# Patient Record
Sex: MALE | Race: WHITE | NOT HISPANIC OR LATINO | Employment: UNEMPLOYED | URBAN - METROPOLITAN AREA
[De-identification: names, ages, dates, MRNs, and addresses within clinical notes are randomized per-mention and may not be internally consistent; named-entity substitution may affect disease eponyms.]

---

## 2023-10-21 ENCOUNTER — HOSPITAL ENCOUNTER (INPATIENT)
Facility: HOSPITAL | Age: 88
LOS: 2 days | Discharge: NURSING, REHAB OR LONG-TERM CARE | DRG: 184 | End: 2023-10-24
Attending: STUDENT IN AN ORGANIZED HEALTH CARE EDUCATION/TRAINING PROGRAM | Admitting: INTERNAL MEDICINE
Payer: MEDICARE

## 2023-10-21 ENCOUNTER — APPOINTMENT (OUTPATIENT)
Dept: RADIOLOGY | Facility: HOSPITAL | Age: 88
DRG: 184 | End: 2023-10-21
Payer: MEDICARE

## 2023-10-21 DIAGNOSIS — N30.00 ACUTE CYSTITIS WITHOUT HEMATURIA: ICD-10-CM
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
## 2023-10-21 DIAGNOSIS — N39.0 UTI (URINARY TRACT INFECTION), UNCOMPLICATED: ICD-10-CM

## 2023-10-21 DIAGNOSIS — S09.90XA INJURY OF HEAD, INITIAL ENCOUNTER: ICD-10-CM

## 2023-10-21 LAB
ANION GAP SERPL CALC-SCNC: 13 MMOL/L
APPEARANCE UR: ABNORMAL
BASOPHILS # BLD AUTO: 0.04 X10*3/UL (ref 0–0.1)
BASOPHILS NFR BLD AUTO: 0.2 %
BILIRUB UR STRIP.AUTO-MCNC: NEGATIVE MG/DL
BUN SERPL-MCNC: 26 MG/DL (ref 8–25)
CALCIUM SERPL-MCNC: 9.7 MG/DL (ref 8.5–10.4)
CHLORIDE SERPL-SCNC: 102 MMOL/L (ref 97–107)
CO2 SERPL-SCNC: 22 MMOL/L (ref 24–31)
COLOR UR: ABNORMAL
CREAT SERPL-MCNC: 1.1 MG/DL (ref 0.4–1.6)
EOSINOPHIL # BLD AUTO: 0.13 X10*3/UL (ref 0–0.4)
EOSINOPHIL NFR BLD AUTO: 0.7 %
ERYTHROCYTE [DISTWIDTH] IN BLOOD BY AUTOMATED COUNT: 13.1 % (ref 11.5–14.5)
GFR SERPL CREATININE-BSD FRML MDRD: 61 ML/MIN/1.73M*2
GLUCOSE SERPL-MCNC: 121 MG/DL (ref 65–99)
GLUCOSE UR STRIP.AUTO-MCNC: NORMAL MG/DL
HCT VFR BLD AUTO: 40.7 % (ref 41–52)
HGB BLD-MCNC: 13.8 G/DL (ref 13.5–17.5)
IMM GRANULOCYTES # BLD AUTO: 0.11 X10*3/UL (ref 0–0.5)
IMM GRANULOCYTES NFR BLD AUTO: 0.6 % (ref 0–0.9)
KETONES UR STRIP.AUTO-MCNC: ABNORMAL MG/DL
LACTATE BLDV-SCNC: 1 MMOL/L (ref 0.4–2)
LEUKOCYTE ESTERASE UR QL STRIP.AUTO: ABNORMAL
LYMPHOCYTES # BLD AUTO: 1.26 X10*3/UL (ref 0.8–3)
LYMPHOCYTES NFR BLD AUTO: 7.1 %
MCH RBC QN AUTO: 30.1 PG (ref 26–34)
MCHC RBC AUTO-ENTMCNC: 33.9 G/DL (ref 32–36)
MCV RBC AUTO: 89 FL (ref 80–100)
MONOCYTES # BLD AUTO: 0.89 X10*3/UL (ref 0.05–0.8)
MONOCYTES NFR BLD AUTO: 5 %
NEUTROPHILS # BLD AUTO: 15.41 X10*3/UL (ref 1.6–5.5)
NEUTROPHILS NFR BLD AUTO: 86.4 %
NITRITE UR QL STRIP.AUTO: NEGATIVE
NRBC BLD-RTO: 0 /100 WBCS (ref 0–0)
PH UR STRIP.AUTO: 5.5 [PH]
PLATELET # BLD AUTO: 302 X10*3/UL (ref 150–450)
PMV BLD AUTO: 9.3 FL (ref 7.5–11.5)
POTASSIUM SERPL-SCNC: 4.4 MMOL/L (ref 3.4–5.1)
PROT UR STRIP.AUTO-MCNC: ABNORMAL MG/DL
RBC # BLD AUTO: 4.58 X10*6/UL (ref 4.5–5.9)
RBC # UR STRIP.AUTO: ABNORMAL /UL
RBC #/AREA URNS AUTO: >20 /HPF
SODIUM SERPL-SCNC: 137 MMOL/L (ref 133–145)
SP GR UR STRIP.AUTO: 1.02
UROBILINOGEN UR STRIP.AUTO-MCNC: NORMAL MG/DL
WBC # BLD AUTO: 17.8 X10*3/UL (ref 4.4–11.3)
WBC #/AREA URNS AUTO: >50 /HPF
WBC CLUMPS #/AREA URNS AUTO: ABNORMAL /HPF
YEAST BUDDING #/AREA UR COMP ASSIST: PRESENT /HPF

## 2023-10-21 PROCEDURE — 81001 URINALYSIS AUTO W/SCOPE: CPT | Performed by: STUDENT IN AN ORGANIZED HEALTH CARE EDUCATION/TRAINING PROGRAM

## 2023-10-21 PROCEDURE — 72131 CT LUMBAR SPINE W/O DYE: CPT | Mod: MG

## 2023-10-21 PROCEDURE — 99285 EMERGENCY DEPT VISIT HI MDM: CPT | Mod: 25 | Performed by: STUDENT IN AN ORGANIZED HEALTH CARE EDUCATION/TRAINING PROGRAM

## 2023-10-21 PROCEDURE — 93010 ELECTROCARDIOGRAM REPORT: CPT | Performed by: INTERNAL MEDICINE

## 2023-10-21 PROCEDURE — 93005 ELECTROCARDIOGRAM TRACING: CPT

## 2023-10-21 PROCEDURE — 36415 COLL VENOUS BLD VENIPUNCTURE: CPT | Performed by: STUDENT IN AN ORGANIZED HEALTH CARE EDUCATION/TRAINING PROGRAM

## 2023-10-21 PROCEDURE — 96365 THER/PROPH/DIAG IV INF INIT: CPT

## 2023-10-21 PROCEDURE — 80048 BASIC METABOLIC PNL TOTAL CA: CPT | Performed by: STUDENT IN AN ORGANIZED HEALTH CARE EDUCATION/TRAINING PROGRAM

## 2023-10-21 PROCEDURE — 2500000001 HC RX 250 WO HCPCS SELF ADMINISTERED DRUGS (ALT 637 FOR MEDICARE OP): Performed by: PHYSICIAN ASSISTANT

## 2023-10-21 PROCEDURE — 36415 COLL VENOUS BLD VENIPUNCTURE: CPT | Performed by: PHYSICIAN ASSISTANT

## 2023-10-21 PROCEDURE — 83605 ASSAY OF LACTIC ACID: CPT | Performed by: PHYSICIAN ASSISTANT

## 2023-10-21 PROCEDURE — 2500000004 HC RX 250 GENERAL PHARMACY W/ HCPCS (ALT 636 FOR OP/ED): Performed by: PHYSICIAN ASSISTANT

## 2023-10-21 PROCEDURE — 87040 BLOOD CULTURE FOR BACTERIA: CPT | Mod: CMCLAB,WESLAB | Performed by: PHYSICIAN ASSISTANT

## 2023-10-21 PROCEDURE — 72128 CT CHEST SPINE W/O DYE: CPT | Mod: MG

## 2023-10-21 PROCEDURE — 85025 COMPLETE CBC W/AUTO DIFF WBC: CPT | Performed by: STUDENT IN AN ORGANIZED HEALTH CARE EDUCATION/TRAINING PROGRAM

## 2023-10-21 PROCEDURE — 71046 X-RAY EXAM CHEST 2 VIEWS: CPT

## 2023-10-21 RX ORDER — FUROSEMIDE 40 MG/1
40 TABLET ORAL
COMMUNITY
Start: 2020-03-24 | End: 2023-10-21 | Stop reason: ALTCHOICE

## 2023-10-21 RX ORDER — TAMSULOSIN HYDROCHLORIDE 0.4 MG/1
0.4 CAPSULE ORAL DAILY
COMMUNITY

## 2023-10-21 RX ORDER — IBUPROFEN 400 MG/1
400 TABLET ORAL ONCE
Status: COMPLETED | OUTPATIENT
Start: 2023-10-21 | End: 2023-10-21

## 2023-10-21 RX ORDER — VIT C/E/ZN/COPPR/LUTEIN/ZEAXAN 250MG-90MG
25 CAPSULE ORAL DAILY
COMMUNITY

## 2023-10-21 RX ORDER — ACETAMINOPHEN 325 MG/1
975 TABLET ORAL ONCE
Status: COMPLETED | OUTPATIENT
Start: 2023-10-21 | End: 2023-10-21

## 2023-10-21 RX ORDER — ASPIRIN 81 MG
100 TABLET, DELAYED RELEASE (ENTERIC COATED) ORAL DAILY
COMMUNITY

## 2023-10-21 RX ORDER — MULTIVIT-MIN/FA/LYCOPEN/LUTEIN .4-300-25
1 TABLET ORAL DAILY
COMMUNITY

## 2023-10-21 RX ORDER — CEFTRIAXONE 1 G/50ML
1 INJECTION, SOLUTION INTRAVENOUS ONCE
Status: COMPLETED | OUTPATIENT
Start: 2023-10-21 | End: 2023-10-21

## 2023-10-21 RX ADMIN — IBUPROFEN 400 MG: 400 TABLET, FILM COATED ORAL at 20:05

## 2023-10-21 RX ADMIN — ACETAMINOPHEN 975 MG: 325 TABLET ORAL at 20:05

## 2023-10-21 RX ADMIN — CEFTRIAXONE SODIUM 1 G: 1 INJECTION, SOLUTION INTRAVENOUS at 23:10

## 2023-10-21 ASSESSMENT — PAIN DESCRIPTION - DESCRIPTORS: DESCRIPTORS: ACHING

## 2023-10-21 ASSESSMENT — COLUMBIA-SUICIDE SEVERITY RATING SCALE - C-SSRS
2. HAVE YOU ACTUALLY HAD ANY THOUGHTS OF KILLING YOURSELF?: NO
6. HAVE YOU EVER DONE ANYTHING, STARTED TO DO ANYTHING, OR PREPARED TO DO ANYTHING TO END YOUR LIFE?: NO
1. IN THE PAST MONTH, HAVE YOU WISHED YOU WERE DEAD OR WISHED YOU COULD GO TO SLEEP AND NOT WAKE UP?: NO

## 2023-10-21 ASSESSMENT — PAIN DESCRIPTION - ORIENTATION: ORIENTATION: RIGHT;LOWER

## 2023-10-21 ASSESSMENT — PAIN - FUNCTIONAL ASSESSMENT: PAIN_FUNCTIONAL_ASSESSMENT: 0-10

## 2023-10-21 ASSESSMENT — PAIN DESCRIPTION - PAIN TYPE: TYPE: ACUTE PAIN

## 2023-10-21 ASSESSMENT — PAIN DESCRIPTION - LOCATION: LOCATION: BACK

## 2023-10-21 ASSESSMENT — PAIN SCALES - GENERAL: PAINLEVEL_OUTOF10: 4

## 2023-10-22 ENCOUNTER — APPOINTMENT (OUTPATIENT)
Dept: RADIOLOGY | Facility: HOSPITAL | Age: 88
DRG: 184 | End: 2023-10-22
Payer: MEDICARE
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
## 2023-10-22 PROCEDURE — 2500000001 HC RX 250 WO HCPCS SELF ADMINISTERED DRUGS (ALT 637 FOR MEDICARE OP): Performed by: INTERNAL MEDICINE

## 2023-10-22 PROCEDURE — 1210000001 HC SEMI-PRIVATE ROOM DAILY

## 2023-10-22 PROCEDURE — 2500000005 HC RX 250 GENERAL PHARMACY W/O HCPCS: Performed by: INTERNAL MEDICINE

## 2023-10-22 PROCEDURE — 2500000004 HC RX 250 GENERAL PHARMACY W/ HCPCS (ALT 636 FOR OP/ED): Performed by: INTERNAL MEDICINE

## 2023-10-22 PROCEDURE — 71101 X-RAY EXAM UNILAT RIBS/CHEST: CPT | Mod: LT,FY

## 2023-10-22 RX ORDER — POLYETHYLENE GLYCOL 3350 17 G/17G
17 POWDER, FOR SOLUTION ORAL DAILY PRN
Status: DISCONTINUED | OUTPATIENT
Start: 2023-10-22 | End: 2023-10-24 | Stop reason: HOSPADM

## 2023-10-22 RX ORDER — GABAPENTIN 100 MG/1
100 CAPSULE ORAL 2 TIMES DAILY
Status: DISCONTINUED | OUTPATIENT
Start: 2023-10-22 | End: 2023-10-24 | Stop reason: HOSPADM

## 2023-10-22 RX ORDER — CHOLECALCIFEROL (VITAMIN D3) 25 MCG
25 TABLET ORAL DAILY
Status: DISCONTINUED | OUTPATIENT
Start: 2023-10-22 | End: 2023-10-24 | Stop reason: HOSPADM

## 2023-10-22 RX ORDER — ACETAMINOPHEN 160 MG/5ML
650 SOLUTION ORAL EVERY 4 HOURS PRN
Status: DISCONTINUED | OUTPATIENT
Start: 2023-10-22 | End: 2023-10-24 | Stop reason: HOSPADM

## 2023-10-22 RX ORDER — ACETAMINOPHEN 325 MG/1
650 TABLET ORAL EVERY 4 HOURS PRN
Status: DISCONTINUED | OUTPATIENT
Start: 2023-10-22 | End: 2023-10-24 | Stop reason: HOSPADM

## 2023-10-22 RX ORDER — DOCUSATE SODIUM 100 MG/1
100 CAPSULE, LIQUID FILLED ORAL DAILY
Status: DISCONTINUED | OUTPATIENT
Start: 2023-10-22 | End: 2023-10-24 | Stop reason: HOSPADM

## 2023-10-22 RX ORDER — IBUPROFEN 400 MG/1
400 TABLET ORAL ONCE
Status: COMPLETED | OUTPATIENT
Start: 2023-10-22 | End: 2023-10-22

## 2023-10-22 RX ORDER — CEFTRIAXONE 1 G/50ML
1 INJECTION, SOLUTION INTRAVENOUS EVERY 24 HOURS
Status: DISCONTINUED | OUTPATIENT
Start: 2023-10-22 | End: 2023-10-23

## 2023-10-22 RX ORDER — ONDANSETRON HYDROCHLORIDE 2 MG/ML
4 INJECTION, SOLUTION INTRAVENOUS EVERY 8 HOURS PRN
Status: DISCONTINUED | OUTPATIENT
Start: 2023-10-22 | End: 2023-10-24 | Stop reason: HOSPADM

## 2023-10-22 RX ORDER — TAMSULOSIN HYDROCHLORIDE 0.4 MG/1
0.4 CAPSULE ORAL DAILY
Status: DISCONTINUED | OUTPATIENT
Start: 2023-10-22 | End: 2023-10-24 | Stop reason: HOSPADM

## 2023-10-22 RX ORDER — ONDANSETRON 4 MG/1
4 TABLET, FILM COATED ORAL EVERY 8 HOURS PRN
Status: DISCONTINUED | OUTPATIENT
Start: 2023-10-22 | End: 2023-10-24 | Stop reason: HOSPADM

## 2023-10-22 RX ORDER — LIDOCAINE 560 MG/1
1 PATCH PERCUTANEOUS; TOPICAL; TRANSDERMAL DAILY
Status: DISCONTINUED | OUTPATIENT
Start: 2023-10-22 | End: 2023-10-24 | Stop reason: HOSPADM

## 2023-10-22 RX ORDER — TRAMADOL HYDROCHLORIDE 50 MG/1
25 TABLET ORAL EVERY 8 HOURS PRN
Status: DISCONTINUED | OUTPATIENT
Start: 2023-10-22 | End: 2023-10-24 | Stop reason: HOSPADM

## 2023-10-22 RX ORDER — ACETAMINOPHEN 650 MG/1
650 SUPPOSITORY RECTAL EVERY 4 HOURS PRN
Status: DISCONTINUED | OUTPATIENT
Start: 2023-10-22 | End: 2023-10-24 | Stop reason: HOSPADM

## 2023-10-22 RX ORDER — ENOXAPARIN SODIUM 100 MG/ML
40 INJECTION SUBCUTANEOUS
Status: DISCONTINUED | OUTPATIENT
Start: 2023-10-22 | End: 2023-10-24 | Stop reason: HOSPADM

## 2023-10-22 RX ADMIN — ACETAMINOPHEN 650 MG: 160 SOLUTION ORAL at 20:10

## 2023-10-22 RX ADMIN — CEFTRIAXONE SODIUM 1 G: 1 INJECTION, SOLUTION INTRAVENOUS at 22:29

## 2023-10-22 RX ADMIN — DOCUSATE SODIUM 100 MG: 100 CAPSULE, LIQUID FILLED ORAL at 11:17

## 2023-10-22 RX ADMIN — IBUPROFEN 400 MG: 400 TABLET, FILM COATED ORAL at 21:15

## 2023-10-22 RX ADMIN — ACETAMINOPHEN 650 MG: 325 TABLET ORAL at 05:00

## 2023-10-22 RX ADMIN — ENOXAPARIN SODIUM 40 MG: 40 INJECTION SUBCUTANEOUS at 11:19

## 2023-10-22 RX ADMIN — TAMSULOSIN HYDROCHLORIDE 0.4 MG: 0.4 CAPSULE ORAL at 11:18

## 2023-10-22 RX ADMIN — LIDOCAINE 1 PATCH: 4 PATCH TOPICAL at 21:15

## 2023-10-22 RX ADMIN — TRAMADOL HYDROCHLORIDE 25 MG: 50 TABLET ORAL at 22:29

## 2023-10-22 RX ADMIN — Medication 25 MCG: at 11:18

## 2023-10-22 RX ADMIN — GABAPENTIN 100 MG: 100 CAPSULE ORAL at 21:15

## 2023-10-22 SDOH — HEALTH STABILITY: MENTAL HEALTH: HOW OFTEN DO YOU HAVE A DRINK CONTAINING ALCOHOL?: NEVER

## 2023-10-22 SDOH — SOCIAL STABILITY: SOCIAL INSECURITY: ARE THERE ANY APPARENT SIGNS OF INJURIES/BEHAVIORS THAT COULD BE RELATED TO ABUSE/NEGLECT?: NO

## 2023-10-22 SDOH — SOCIAL STABILITY: SOCIAL INSECURITY
WITHIN THE LAST YEAR, HAVE TO BEEN RAPED OR FORCED TO HAVE ANY KIND OF SEXUAL ACTIVITY BY YOUR PARTNER OR EX-PARTNER?: NO

## 2023-10-22 SDOH — SOCIAL STABILITY: SOCIAL INSECURITY: WITHIN THE LAST YEAR, HAVE YOU BEEN AFRAID OF YOUR PARTNER OR EX-PARTNER?: NO

## 2023-10-22 SDOH — SOCIAL STABILITY: SOCIAL NETWORK: HOW OFTEN DO YOU ATTEND CHURCH OR RELIGIOUS SERVICES?: NEVER

## 2023-10-22 SDOH — HEALTH STABILITY: MENTAL HEALTH: HOW OFTEN DO YOU HAVE 6 OR MORE DRINKS ON ONE OCCASION?: NEVER

## 2023-10-22 SDOH — HEALTH STABILITY: MENTAL HEALTH
STRESS IS WHEN SOMEONE FEELS TENSE, NERVOUS, ANXIOUS, OR CAN'T SLEEP AT NIGHT BECAUSE THEIR MIND IS TROUBLED. HOW STRESSED ARE YOU?: ONLY A LITTLE

## 2023-10-22 SDOH — SOCIAL STABILITY: SOCIAL NETWORK
DO YOU BELONG TO ANY CLUBS OR ORGANIZATIONS SUCH AS CHURCH GROUPS UNIONS, FRATERNAL OR ATHLETIC GROUPS, OR SCHOOL GROUPS?: NO

## 2023-10-22 SDOH — ECONOMIC STABILITY: FOOD INSECURITY: WITHIN THE PAST 12 MONTHS, YOU WORRIED THAT YOUR FOOD WOULD RUN OUT BEFORE YOU GOT MONEY TO BUY MORE.: NEVER TRUE

## 2023-10-22 SDOH — SOCIAL STABILITY: SOCIAL INSECURITY: ARE YOU OR HAVE YOU BEEN THREATENED OR ABUSED PHYSICALLY, EMOTIONALLY, OR SEXUALLY BY ANYONE?: NO

## 2023-10-22 SDOH — SOCIAL STABILITY: SOCIAL NETWORK: HOW OFTEN DO YOU GET TOGETHER WITH FRIENDS OR RELATIVES?: THREE TIMES A WEEK

## 2023-10-22 SDOH — SOCIAL STABILITY: SOCIAL NETWORK: ARE YOU MARRIED, WIDOWED, DIVORCED, SEPARATED, NEVER MARRIED, OR LIVING WITH A PARTNER?: WIDOWED

## 2023-10-22 SDOH — SOCIAL STABILITY: SOCIAL INSECURITY: WITHIN THE LAST YEAR, HAVE YOU BEEN HUMILIATED OR EMOTIONALLY ABUSED IN OTHER WAYS BY YOUR PARTNER OR EX-PARTNER?: NO

## 2023-10-22 SDOH — ECONOMIC STABILITY: FOOD INSECURITY: WITHIN THE PAST 12 MONTHS, THE FOOD YOU BOUGHT JUST DIDN'T LAST AND YOU DIDN'T HAVE MONEY TO GET MORE.: NEVER TRUE

## 2023-10-22 SDOH — SOCIAL STABILITY: SOCIAL INSECURITY: DO YOU FEEL UNSAFE GOING BACK TO THE PLACE WHERE YOU ARE LIVING?: NO

## 2023-10-22 SDOH — SOCIAL STABILITY: SOCIAL INSECURITY: HAVE YOU HAD THOUGHTS OF HARMING ANYONE ELSE?: NO

## 2023-10-22 SDOH — HEALTH STABILITY: PHYSICAL HEALTH: ON AVERAGE, HOW MANY MINUTES DO YOU ENGAGE IN EXERCISE AT THIS LEVEL?: 0 MIN

## 2023-10-22 SDOH — HEALTH STABILITY: PHYSICAL HEALTH: ON AVERAGE, HOW MANY DAYS PER WEEK DO YOU ENGAGE IN MODERATE TO STRENUOUS EXERCISE (LIKE A BRISK WALK)?: 0 DAYS

## 2023-10-22 SDOH — ECONOMIC STABILITY: HOUSING INSECURITY
IN THE LAST 12 MONTHS, WAS THERE A TIME WHEN YOU DID NOT HAVE A STEADY PLACE TO SLEEP OR SLEPT IN A SHELTER (INCLUDING NOW)?: NO

## 2023-10-22 SDOH — SOCIAL STABILITY: SOCIAL NETWORK: HOW OFTEN DO YOU ATTEND CHURCH OR RELIGIOUS SERVICES?: 1 TO 4 TIMES PER YEAR

## 2023-10-22 SDOH — SOCIAL STABILITY: SOCIAL INSECURITY
WITHIN THE LAST YEAR, HAVE YOU BEEN KICKED, HIT, SLAPPED, OR OTHERWISE PHYSICALLY HURT BY YOUR PARTNER OR EX-PARTNER?: NO

## 2023-10-22 SDOH — ECONOMIC STABILITY: HOUSING INSECURITY: IN THE LAST 12 MONTHS, HOW MANY PLACES HAVE YOU LIVED?: 1

## 2023-10-22 SDOH — ECONOMIC STABILITY: TRANSPORTATION INSECURITY
IN THE PAST 12 MONTHS, HAS LACK OF TRANSPORTATION KEPT YOU FROM MEETINGS, WORK, OR FROM GETTING THINGS NEEDED FOR DAILY LIVING?: NO

## 2023-10-22 SDOH — SOCIAL STABILITY: SOCIAL NETWORK: ARE YOU MARRIED, WIDOWED, DIVORCED, SEPARATED, NEVER MARRIED, OR LIVING WITH A PARTNER?: MARRIED

## 2023-10-22 SDOH — SOCIAL STABILITY: SOCIAL INSECURITY: HAS ANYONE EVER THREATENED TO HURT YOUR FAMILY OR YOUR PETS?: NO

## 2023-10-22 SDOH — ECONOMIC STABILITY: INCOME INSECURITY: IN THE LAST 12 MONTHS, WAS THERE A TIME WHEN YOU WERE NOT ABLE TO PAY THE MORTGAGE OR RENT ON TIME?: NO

## 2023-10-22 SDOH — ECONOMIC STABILITY: INCOME INSECURITY: IN THE PAST 12 MONTHS, HAS THE ELECTRIC, GAS, OIL, OR WATER COMPANY THREATENED TO SHUT OFF SERVICE IN YOUR HOME?: NO

## 2023-10-22 SDOH — SOCIAL STABILITY: SOCIAL INSECURITY: DOES ANYONE TRY TO KEEP YOU FROM HAVING/CONTACTING OTHER FRIENDS OR DOING THINGS OUTSIDE YOUR HOME?: NO

## 2023-10-22 SDOH — SOCIAL STABILITY: SOCIAL INSECURITY: DO YOU FEEL ANYONE HAS EXPLOITED OR TAKEN ADVANTAGE OF YOU FINANCIALLY OR OF YOUR PERSONAL PROPERTY?: NO

## 2023-10-22 SDOH — ECONOMIC STABILITY: TRANSPORTATION INSECURITY
IN THE PAST 12 MONTHS, HAS THE LACK OF TRANSPORTATION KEPT YOU FROM MEDICAL APPOINTMENTS OR FROM GETTING MEDICATIONS?: NO

## 2023-10-22 SDOH — SOCIAL STABILITY: SOCIAL INSECURITY: ABUSE: ADULT

## 2023-10-22 SDOH — SOCIAL STABILITY: SOCIAL NETWORK: HOW OFTEN DO YOU ATTENT MEETINGS OF THE CLUB OR ORGANIZATION YOU BELONG TO?: NEVER

## 2023-10-22 SDOH — HEALTH STABILITY: MENTAL HEALTH
STRESS IS WHEN SOMEONE FEELS TENSE, NERVOUS, ANXIOUS, OR CAN'T SLEEP AT NIGHT BECAUSE THEIR MIND IS TROUBLED. HOW STRESSED ARE YOU?: NOT AT ALL

## 2023-10-22 SDOH — ECONOMIC STABILITY: INCOME INSECURITY: HOW HARD IS IT FOR YOU TO PAY FOR THE VERY BASICS LIKE FOOD, HOUSING, MEDICAL CARE, AND HEATING?: NOT HARD AT ALL

## 2023-10-22 SDOH — HEALTH STABILITY: MENTAL HEALTH: HOW MANY STANDARD DRINKS CONTAINING ALCOHOL DO YOU HAVE ON A TYPICAL DAY?: PATIENT DOES NOT DRINK

## 2023-10-22 SDOH — SOCIAL STABILITY: SOCIAL NETWORK: IN A TYPICAL WEEK, HOW MANY TIMES DO YOU TALK ON THE PHONE WITH FAMILY, FRIENDS, OR NEIGHBORS?: THREE TIMES A WEEK

## 2023-10-22 SDOH — SOCIAL STABILITY: SOCIAL INSECURITY: WERE YOU ABLE TO COMPLETE ALL THE BEHAVIORAL HEALTH SCREENINGS?: YES

## 2023-10-22 SDOH — SOCIAL STABILITY: SOCIAL NETWORK
IN A TYPICAL WEEK, HOW MANY TIMES DO YOU TALK ON THE PHONE WITH FAMILY, FRIENDS, OR NEIGHBORS?: MORE THAN THREE TIMES A WEEK

## 2023-10-22 SDOH — SOCIAL STABILITY: SOCIAL NETWORK: HOW OFTEN DO YOU GET TOGETHER WITH FRIENDS OR RELATIVES?: MORE THAN THREE TIMES A WEEK

## 2023-10-22 SDOH — SOCIAL STABILITY: SOCIAL INSECURITY: WERE YOU ABLE TO COMPLETE ALL THE BEHAVIORAL HEALTH SCREENINGS?: NO

## 2023-10-22 ASSESSMENT — COGNITIVE AND FUNCTIONAL STATUS - GENERAL
PERSONAL GROOMING: A LOT
DAILY ACTIVITIY SCORE: 13
MOVING TO AND FROM BED TO CHAIR: A LITTLE
DRESSING REGULAR LOWER BODY CLOTHING: A LITTLE
STANDING UP FROM CHAIR USING ARMS: A LITTLE
DRESSING REGULAR LOWER BODY CLOTHING: A LITTLE
TURNING FROM BACK TO SIDE WHILE IN FLAT BAD: A LITTLE
TURNING FROM BACK TO SIDE WHILE IN FLAT BAD: A LITTLE
PATIENT BASELINE BEDBOUND: NO
WALKING IN HOSPITAL ROOM: A LITTLE
WALKING IN HOSPITAL ROOM: A LOT
EATING MEALS: A LITTLE
STANDING UP FROM CHAIR USING ARMS: A LOT
CLIMB 3 TO 5 STEPS WITH RAILING: A LOT
DRESSING REGULAR UPPER BODY CLOTHING: A LOT
HELP NEEDED FOR BATHING: A LOT
MOBILITY SCORE: 14
EATING MEALS: A LOT
CLIMB 3 TO 5 STEPS WITH RAILING: A LITTLE
MOVING FROM LYING ON BACK TO SITTING ON SIDE OF FLAT BED WITH BEDRAILS: A LITTLE
MOVING FROM LYING ON BACK TO SITTING ON SIDE OF FLAT BED WITH BEDRAILS: A LITTLE
PERSONAL GROOMING: A LITTLE
MOVING TO AND FROM BED TO CHAIR: A LOT
DAILY ACTIVITIY SCORE: 18
TOILETING: A LOT
HELP NEEDED FOR BATHING: A LITTLE
MOBILITY SCORE: 18
TOILETING: A LITTLE
DRESSING REGULAR UPPER BODY CLOTHING: A LITTLE

## 2023-10-22 ASSESSMENT — LIFESTYLE VARIABLES
REASON UNABLE TO ASSESS: NO
SKIP TO QUESTIONS 9-10: 1
AUDIT-C TOTAL SCORE: 0
HOW OFTEN DO YOU HAVE A DRINK CONTAINING ALCOHOL: NEVER
EVER FELT BAD OR GUILTY ABOUT YOUR DRINKING: NO
HOW OFTEN DO YOU HAVE 6 OR MORE DRINKS ON ONE OCCASION: NEVER
SKIP TO QUESTIONS 9-10: 1
EVER HAD A DRINK FIRST THING IN THE MORNING TO STEADY YOUR NERVES TO GET RID OF A HANGOVER: NO
HOW MANY STANDARD DRINKS CONTAINING ALCOHOL DO YOU HAVE ON A TYPICAL DAY: PATIENT DOES NOT DRINK
HOW MANY STANDARD DRINKS CONTAINING ALCOHOL DO YOU HAVE ON A TYPICAL DAY: PATIENT DOES NOT DRINK
PRESCIPTION_ABUSE_PAST_12_MONTHS: NO
PRESCIPTION_ABUSE_PAST_12_MONTHS: NO
HOW OFTEN DO YOU HAVE A DRINK CONTAINING ALCOHOL: NEVER
AUDIT-C TOTAL SCORE: 0
AUDIT-C TOTAL SCORE: 0
SUBSTANCE_ABUSE_PAST_12_MONTHS: NO
HAVE YOU EVER FELT YOU SHOULD CUT DOWN ON YOUR DRINKING: NO
HAVE PEOPLE ANNOYED YOU BY CRITICIZING YOUR DRINKING: NO
SUBSTANCE_ABUSE_PAST_12_MONTHS: NO
AUDIT-C TOTAL SCORE: 0
HOW OFTEN DO YOU HAVE 6 OR MORE DRINKS ON ONE OCCASION: NEVER
SKIP TO QUESTIONS 9-10: 1
AUDIT-C TOTAL SCORE: 0

## 2023-10-22 ASSESSMENT — PAIN - FUNCTIONAL ASSESSMENT
PAIN_FUNCTIONAL_ASSESSMENT: WONG-BAKER FACES
PAIN_FUNCTIONAL_ASSESSMENT: PAINAD (PAIN ASSESSMENT IN ADVANCED DEMENTIA SCALE)
PAIN_FUNCTIONAL_ASSESSMENT: 0-10

## 2023-10-22 ASSESSMENT — PAIN SCALES - GENERAL
PAINLEVEL_OUTOF10: 0 - NO PAIN
PAINLEVEL_OUTOF10: 0 - NO PAIN
PAINLEVEL_OUTOF10: 5 - MODERATE PAIN
PAINLEVEL_OUTOF10: 8
PAINLEVEL_OUTOF10: 5 - MODERATE PAIN
PAINLEVEL_OUTOF10: 6
PAINLEVEL_OUTOF10: 8

## 2023-10-22 ASSESSMENT — PAIN DESCRIPTION - DESCRIPTORS: DESCRIPTORS: SHOOTING;SHARP

## 2023-10-22 ASSESSMENT — ACTIVITIES OF DAILY LIVING (ADL)
JUDGMENT_ADEQUATE_SAFELY_COMPLETE_DAILY_ACTIVITIES: NO
HEARING - RIGHT EAR: FUNCTIONAL
WALKS IN HOME: NEEDS ASSISTANCE
DRESSING YOURSELF: NEEDS ASSISTANCE
FEEDING YOURSELF: INDEPENDENT
PATIENT'S MEMORY ADEQUATE TO SAFELY COMPLETE DAILY ACTIVITIES?: NO
ADEQUATE_TO_COMPLETE_ADL: NO
BATHING: NEEDS ASSISTANCE
TOILETING: NEEDS ASSISTANCE
LACK_OF_TRANSPORTATION: NO
GROOMING: NEEDS ASSISTANCE
HEARING - LEFT EAR: FUNCTIONAL

## 2023-10-22 ASSESSMENT — COLUMBIA-SUICIDE SEVERITY RATING SCALE - C-SSRS
2. HAVE YOU ACTUALLY HAD ANY THOUGHTS OF KILLING YOURSELF?: NO
1. IN THE PAST MONTH, HAVE YOU WISHED YOU WERE DEAD OR WISHED YOU COULD GO TO SLEEP AND NOT WAKE UP?: NO
6. HAVE YOU EVER DONE ANYTHING, STARTED TO DO ANYTHING, OR PREPARED TO DO ANYTHING TO END YOUR LIFE?: NO

## 2023-10-22 ASSESSMENT — PATIENT HEALTH QUESTIONNAIRE - PHQ9
2. FEELING DOWN, DEPRESSED OR HOPELESS: NOT AT ALL
SUM OF ALL RESPONSES TO PHQ9 QUESTIONS 1 & 2: 0
1. LITTLE INTEREST OR PLEASURE IN DOING THINGS: NOT AT ALL
1. LITTLE INTEREST OR PLEASURE IN DOING THINGS: NOT AT ALL

## 2023-10-22 NOTE — ED NOTES
Attempted to call report to Guido on vocera, he is not available at this time. Will call back.     Clari Jones RN  10/22/23 7298

## 2023-10-22 NOTE — PROGRESS NOTES
Boaz Bowens is a 89 y.o. male on day 0 of admission presenting with Fall, initial encounter.      Subjective   Patient reports that he feels okay. He asks that we check his blood pressure less-- says his arm hurts (BP running q30 min per dynamap settings-->I changed this to q4 hr). He denies any pain or SOB. Denies being hungry.        Objective     Last Recorded Vitals  /89   Pulse 63   Temp 36.4 °C (97.5 °F) (Axillary)   Resp 16   Wt 72.6 kg (160 lb)   SpO2 96%   Intake/Output last 3 Shifts:  No intake or output data in the 24 hours ending 10/22/23 1532    Admission Weight  Weight: 72.6 kg (160 lb) (10/21/23 1937)    Daily Weight  10/21/23 : 72.6 kg (160 lb)    Image Results  CT thoracic spine wo IV contrast  Narrative: Interpreted By:  Rayna Thakkar,   STUDY:  CT THORACIC SPINE WO IV CONTRAST; 10/21/2023 8:51 pm      INDICATION:  Signs/Symptoms:fall;      COMPARISON:  None      ACCESSION NUMBER(S):  [id number]      ORDERING CLINICIAN:  ADELITA CLEANING      TECHNIQUE:  Contiguous axial images of the thoracic spine were performed.      FINDINGS:  No acute fracture or spondylolisthesis is identified.      The vertebral bodies and disc spaces are grossly preserved.      The neural foramina and spinal canal are grossly patent.      Impression: No acute fracture.      Signed by: Rayna Thakkar 10/21/2023 9:03 PM  Dictation workstation:   [id number]  CT lumbar spine wo IV contrast  Narrative: Interpreted By:  Rayna Thakkar,   STUDY:  CT LUMBAR SPINE WO IV CONTRAST; 10/21/2023 8:52 pm      INDICATION:  Signs/Symptoms:fall;      COMPARISON:  None      ACCESSION NUMBER(S):  [id number]      ORDERING CLINICIAN:  ADELITA CLEANING      TECHNIQUE:  Contiguous axial images of the lumbar spine were performed.      FINDINGS:  There is a levo scoliotic curvature of the lumbar spine centered at  L3. No acute fracture or spondylolisthesis is identified. There are  moderate multilevel degenerative changes of the  lumbar spine with  loss of disc height and facet osteoarthropathy most severe at L1-2,  L2-3, L4-L5. There is grade 1 anterolisthesis of L4 on L5. There is  vacuum disc phenomenon at multiple levels.      The neural foramina and spinal canal are grossly patent.      The SI joints are intact.      Diffuse diverticulosis.      Impression: No acute fracture.  Multilevel degenerative changes, as described.          Signed by: Rayna Thakkar 10/21/2023 9:02 PM  Dictation workstation:   [id number]  XR chest 2 views  Narrative: Interpreted By:  Rayna Thakkar,   STUDY:  XR CHEST 2 VIEWS; 10/21/2023 8:24 pm      INDICATION:  Signs/Symptoms:fall      COMPARISON:  None      ACCESSION NUMBER(S):  [id number]      ORDERING CLINICIAN:  ADELITA CLEANING      TECHNIQUE:  From lateral radiographs.      FINDINGS:  The cardiomediastinal silhouette is unremarkable. Bibasilar  atelectasis. No pleural effusion is identified.      The osseous structures are intact.      Impression: No acute cardiopulmonary process.  Bibasilar atelectasis.          Signed by: Rayna Thakkar 10/21/2023 8:30 PM  Dictation workstation:   [id number]      Physical Exam  General: alert, no diaphoresis   HENT: mucous membranes moist, external ears normal, no rhinorrhea   Eyes: no icterus or injection, no discharge   Lungs: CTA BL   Heart: RRR,  no LE edema BL   GI: abdomen soft, nontender, nondistended, BS present   MSK: no joint effusion or deformity   Skin: no rashes, erythema, or ecchymosis   Neuro: grossly normal cognition but forgetful, motor strength, sensation      Relevant Results               Scheduled medications  cefTRIAXone, 1 g, intravenous, q24h  cholecalciferol, 25 mcg, oral, Daily  docusate sodium, 100 mg, oral, Daily  enoxaparin, 40 mg, subcutaneous, q24h KRISTINE  tamsulosin, 0.4 mg, oral, Daily      Continuous medications     PRN medications  PRN medications: acetaminophen **OR** acetaminophen **OR** acetaminophen, ondansetron **OR**  ondansetron, polyethylene glycol      Assessment/Plan                  Principal Problem:    Fall, initial encounter  Active Problems:    Fall    UTI (urinary tract infection)  UTI  - ceftriaxone. UA shows budding yeast. Will ask ID to evaluate regarding need for antifungal    Fall  - mechanical.  - pt/ot    Leukocytosis  - I expect this is secondary to patient's UTI  - recheck in morning    DVT ppx    DNR CCA, DNI  Patient previously enrolled in hospice (apparently for additional services), but suspended hospice to come into hospital.               Jena Wasserman, DO

## 2023-10-22 NOTE — PROGRESS NOTES
Boaz Bowens is a 89 y.o. male on day 0 of admission presenting with Fall, initial encounter.    He also has a UTI. He lives at [place] and will return upon discharge. Discussed with patient and his daughter/POA Silvia the possibility of SNF/HHC. If needed, Silvia would like to keep within the the [facility] and chooses their facility/hhc agency. Follow up for PT/OT evals.   Patient has been receiving [facility] services. Those services were suspended for this hospitalization. She may choose to restart hospice vs seeking hhc.     Kenzie Rhoades RN

## 2023-10-22 NOTE — NURSING NOTE
Page out to Dr. Wasserman regarding patient's left sided stabbing abdominal pain, awaiting return call

## 2023-10-22 NOTE — ED PROVIDER NOTES
Patient was seen by both myself and advanced practitioner.  I performed substantive portion of the visit including all aspects of the medical decision making.  Please refer to advanced practitioner's note further workup, evaluation.    Patient is a 89-year-old male that presents emergency room for evaluation of a witnessed fall.  Patient was reportedly backing up to get onto the bed while at home health aide was walking around to the other side of the bed to grab a urinal for the patient.  Patient lost his footing and fell backwards onto his buttocks.  Patient did not hit his head or lose conscious however has felt pain in his low back since that time.  He describes it as an aching throbbing sensation.  Nothing seems to make it better or worse.  He has been unable to walk since that time.  Patient typically does walk with a walker at home.  He denies fever, chills, chest pain, shortness breath, abdominal pain, nausea or vomiting.  Patient not on any blood thinners.    On exam patient uncomfortable appearing but in no obvious distress.  Vital signs are stable on arrival.  He is awake, alert and oriented.  He has some generalized weakness but no obvious focal motor deficit.  Abdomen soft, nontender, nondistended.  There is no peripheral pitting edema.  Lungs are clear to auscultation bilaterally.  Blood work ordered including CBC, CMP along with urinalysis.  Given his significant tenderness palpation of the lumbar spine CT scan of the thoracic and lumbar spine was ordered.  He did not hit his head or lose conscious has no neurologic deficit and no CT scan of the brain was ordered.  CT scan showed no evidence of fracture.  X-ray of the chest shows no evidence of pneumonia, pneumothorax, wide mediastinum.  He does have a significant leukocytosis of 17.8 on blood work as well as obvious urinary tract infection with significant leukocyte esterase and pyuria.  Patient given IV Rocephin.  In further discussion with family  he does have some increased confusion over the last several days.  Patient will be admitted for further antibiotic treatment as family is concerned about the level of care he is able to receive at the independent living facility as well as his increased confusion.      EKG Time: 1945  EKG Interpretation time: 1946  EKG Interpretation: EKG shows normal sinus rhythm, left axis deviation, nonspecific ST and T wave changes, QTc 419, no evidence of STEMI    EKG was interpreted by myself independently     Enrico May,   10/21/23 5596

## 2023-10-22 NOTE — NURSING NOTE
Pt arrived to room 402A from ED in stable condition. Pt alert x1-2. Oreinted to new room, call bell and phone use. LHS folder/rmenu given. Bed alarm engaged. VS taken. Admission history taken. Will continue to monitor pt.

## 2023-10-22 NOTE — ED PROVIDER NOTES
HPI   Chief Complaint   Patient presents with    Fall     Mechanical fall getting ready for bed, complaints of right lower back pain. EMS reports Hospice pt with cancer diagnosis. Family to follow.    Back Pain       89-year-old male presented emergency department with a chief complaint of fall.  States he had a mechanical fall earlier today.  He lives by himself in an independent living facility.  He states he did not hit his head or have a loss of consciousness.  He denies anticoagulation.  He complains of low back pain.  He denies any other injury or trauma.  Denies numbness or weakness.  His daughter states she has been mildly confused                          [place] Coma Scale Score: 14                  Patient History   Past Medical History:   Diagnosis Date    Cancer (CMS/HCC)     Urethral cancer (CMS/HCC)      History reviewed. No pertinent surgical history.  No family history on file.  Social History     Tobacco Use    Smoking status: Former     Types: Cigarettes    Smokeless tobacco: Not on file   Vaping Use    Vaping Use: Never used   Substance Use Topics    Alcohol use: Not on file    Drug use: Never       Physical Exam   ED Triage Vitals [10/21/23 1937]   Temp Heart Rate Resp BP   36.4 °C (97.5 °F) 77 18 --      SpO2 Temp Source Heart Rate Source Patient Position   99 % Oral Monitor Sitting      BP Location FiO2 (%)     Left arm --       Physical Exam  Vitals and nursing note reviewed.   Constitutional:       Appearance: Normal appearance.   HENT:      Head: Normocephalic and atraumatic.   Cardiovascular:      Rate and Rhythm: Normal rate and regular rhythm.      Pulses: Normal pulses.      Heart sounds: Normal heart sounds.   Pulmonary:      Effort: Pulmonary effort is normal.      Breath sounds: Normal breath sounds.   Abdominal:      General: Abdomen is flat. Bowel sounds are normal.      Palpations: Abdomen is soft.   Musculoskeletal:         General: Normal range of motion.      Cervical back:  Normal range of motion and neck supple.   Skin:     General: Skin is warm and dry.   Neurological:      General: No focal deficit present.      Mental Status: He is alert and oriented to person, place, and time.   Psychiatric:         Mood and Affect: Mood normal.         ED Course & MDM   Diagnoses as of 10/21/23 2257   Fall, initial encounter   UTI (urinary tract infection), uncomplicated   Injury of head, initial encounter       Medical Decision Making  Labs Reviewed  CBC WITH AUTO DIFFERENTIAL - Abnormal     WBC                           17.8 (*)               nRBC                          0.0                    RBC                           4.58                   Hemoglobin                    13.8                   Hematocrit                    40.7 (*)               MCV                           89                     MCH                           30.1                   MCHC                          33.9                   RDW                           13.1                   Platelets                     302                    MPV                           9.3                    Neutrophils %                 86.4                   Immature Granulocytes %, Automated   0.6                    Lymphocytes %                 7.1                    Monocytes %                   5.0                    Eosinophils %                 0.7                    Basophils %                   0.2                    Neutrophils Absolute          15.41 (*)               Immature Granulocytes Absolute, Au*   0.11                   Lymphocytes Absolute          1.26                   Monocytes Absolute            0.89 (*)               Eosinophils Absolute          0.13                   Basophils Absolute            0.04                BASIC METABOLIC PANEL - Abnormal     Glucose                       121 (*)                Sodium                        137                    Potassium                     4.4                     Chloride                      102                    Bicarbonate                   22 (*)                 Urea Nitrogen                 26 (*)                 Creatinine                    1.10                   eGFR                          61                     Calcium                       9.7                    Anion Gap                     13                  URINALYSIS WITH REFLEX MICROSCOPIC - Abnormal     Color, Urine                    (*)                  Appearance, Urine             Ex.Turbid (*)               Specific Gravity, Urine       1.019                  pH, Urine                     5.5                    Protein, Urine                100 (2+) (*)               Glucose, Urine                Normal                 Blood, Urine                  OVER (3+) (*)               Ketones, Urine                10 (1+) (*)               Bilirubin, Urine              NEGATIVE                Urobilinogen, Urine           Normal                 Nitrite, Urine                NEGATIVE                Leukocyte Esterase, Urine     500 Winifred/µL (*)            MICROSCOPIC ONLY, URINE - Abnormal     WBC, Urine                    >50 (*)                WBC Clumps, Urine             FEW                    RBC, Urine                    >20 (*)                Budding Yeast, Urine          PRESENT (*)            BLOOD GAS LACTIC ACID, VENOUS - Normal     POCT Lactate, Venous          1.0                 BLOOD CULTURE  CT lumbar spine wo IV contrast   Final Result    No acute fracture.    Multilevel degenerative changes, as described.                Signed by: Rayna Thakkar 10/21/2023 9:02 PM    Dictation workstation:   [id number]     CT thoracic spine wo IV contrast   Final Result    No acute fracture.          Signed by: Rayna Thakkar 10/21/2023 9:03 PM    Dictation workstation:   [id number]     XR chest 2 views   Final Result    No acute cardiopulmonary process.    Bibasilar atelectasis.                Signed by:  Rayna Thakkar 10/21/2023 8:30 PM    Dictation workstation:   [id number]     I have seen and evaluated this patient.  The attending physician has also seen and evaluated this patient.  Vital signs, laboratory testing and diagnostic images if applicable have been reviewed.  All laboratory and imaging is interpreted by myself unless otherwise stated.  Radiology studies are also formally interpreted by radiologist.    Patient was 17,000 leukocytosis, no significant anemia.  Metabolic panel without renal impairment or electrolyte abnormality.  Urinalysis infected.  Patient admitted for further treatment and management of urinary tract infection.        Procedure  Procedures     Enrico Gallego PA-C  10/21/23 0657

## 2023-10-22 NOTE — NURSING NOTE
Assumed care of patient, BSSR complete, patient resting in bed complaining of abdominal pain, will assess PRN pain medications and notify hospitalist, no other needs at this time, bed alarm set and call light within reach

## 2023-10-22 NOTE — H&P
History Of Present Illness  Boaz Bowens is a 89 y.o. male presenting with a fall.  He lives in an independent living facility at [place] and receives his primary care through the [facility].  The patient was drowsy, arousable, who provided very limited history.  At baseline, he is oriented to self and sometimes to place. No report of a diagnosis of dementia. The history was obtained from the emergency room provider and from his daughter, Silvia, who was at the bedside.   The patient was walking in his bedroom using his walker, with a caregiver and needed to use a urinal.  The caregiver went around the bed to get the urinal for him and the patient turned around and attempted to sit on the bed.  He was not close enough to the bed and fell to the floor and landed on his bottom.  He complained of pain in the left side of the lower back and presented to the emergency department for further evaluation . Lab testing showed a white blood cell count of 17.8 and the urinalysis was positive for a UTI.  CT scans of the thoracic spine and the lumbar spine showed no fractures or acute changes, and a chest x-ray was also unremarkable.  A dose of IV ceftriaxone was ordered.  His daughter states that the patient used to self catheterize, until about 4 years ago.  He takes Cellusan, presumably for prostate enlargement       Past Medical History  Past Medical History:   Diagnosis Date    Cancer (CMS/HCC)     Frequent urinary tract infections     Urethral cancer (CMS/HCC)        Surgical History  Past Surgical History:   Procedure Laterality Date    APPENDECTOMY      HEMORRHOID SURGERY      TOTAL KNEE ARTHROPLASTY Right     TOTAL KNEE ARTHROPLASTY Left         Social History  He reports that he has quit smoking. His smoking use included cigarettes. He does not have any smokeless tobacco history on file. He reports that he does not use drugs. No history on file for alcohol use.    Family History  Family History   Problem  "Relation Name Age of Onset    Angina Mother      Cancer Father      Stroke Father      Stroke Sister      Cancer Sister          Allergies  Patient has no known allergies.    Review of Systems  A complete review of systems could not be done because the patient was very drowsy and only woke up for short period.      Physical Exam   General.: Sleeping, arousable, following commands, responsive.    HEENT: Normocephalic, not icteric, not pale, no facial asymmetry, no pharyngeal erythema.   Neck: Supple, no carotid bruit, no thyroid enlargement.   Cardiovascular: Regular heart rate and rhythm normal S1 and S2.   Respiratory: Equal breath sounds bilaterally clear to auscultation.   Abdomen: Soft, nontender to palpation, bowel sounds present and normoactive.   Extremities: No peripheral cyanosis, no pedal edema.   Neurologic: Sleeping, arousable, oriented to self, muscle strength 5/5 in all extremities. Gait was not tested   Dermatologic: No rash, ecchymosis, or jaundice.   Psychological: Unable to evaluate      Last Recorded Vitals  Blood pressure 138/78, pulse 75, temperature 36.4 °C (97.5 °F), temperature source Oral, resp. rate 14, height 1.778 m (5' 10\"), weight 72.6 kg (160 lb), SpO2 99 %.    Relevant Results  Results for orders placed or performed during the hospital encounter of 10/21/23 (from the past 24 hour(s))   CBC and Auto Differential   Result Value Ref Range    WBC 17.8 (H) 4.4 - 11.3 x10*3/uL    nRBC 0.0 0.0 - 0.0 /100 WBCs    RBC 4.58 4.50 - 5.90 x10*6/uL    Hemoglobin 13.8 13.5 - 17.5 g/dL    Hematocrit 40.7 (L) 41.0 - 52.0 %    MCV 89 80 - 100 fL    MCH 30.1 26.0 - 34.0 pg    MCHC 33.9 32.0 - 36.0 g/dL    RDW 13.1 11.5 - 14.5 %    Platelets 302 150 - 450 x10*3/uL    MPV 9.3 7.5 - 11.5 fL    Neutrophils % 86.4 40.0 - 80.0 %    Immature Granulocytes %, Automated 0.6 0.0 - 0.9 %    Lymphocytes % 7.1 13.0 - 44.0 %    Monocytes % 5.0 2.0 - 10.0 %    Eosinophils % 0.7 0.0 - 6.0 %    Basophils % 0.2 0.0 - 2.0 " %    Neutrophils Absolute 15.41 (H) 1.60 - 5.50 x10*3/uL    Immature Granulocytes Absolute, Automated 0.11 0.00 - 0.50 x10*3/uL    Lymphocytes Absolute 1.26 0.80 - 3.00 x10*3/uL    Monocytes Absolute 0.89 (H) 0.05 - 0.80 x10*3/uL    Eosinophils Absolute 0.13 0.00 - 0.40 x10*3/uL    Basophils Absolute 0.04 0.00 - 0.10 x10*3/uL   Basic metabolic panel   Result Value Ref Range    Glucose 121 (H) 65 - 99 mg/dL    Sodium 137 133 - 145 mmol/L    Potassium 4.4 3.4 - 5.1 mmol/L    Chloride 102 97 - 107 mmol/L    Bicarbonate 22 (L) 24 - 31 mmol/L    Urea Nitrogen 26 (H) 8 - 25 mg/dL    Creatinine 1.10 0.40 - 1.60 mg/dL    eGFR 61 >60 mL/min/1.73m*2    Calcium 9.7 8.5 - 10.4 mg/dL    Anion Gap 13 <=19 mmol/L   Urinalysis with Reflex Microscopic   Result Value Ref Range    Color, Urine Light-Orange (N) Light-Yellow, Yellow, Dark-Yellow    Appearance, Urine Ex.Turbid (N) Clear    Specific Gravity, Urine 1.019 1.005 - 1.035    pH, Urine 5.5 5.0, 5.5, 6.0, 6.5, 7.0, 7.5, 8.0    Protein, Urine 100 (2+) (A) NEGATIVE, 10 (TRACE), 20 (TRACE) mg/dL    Glucose, Urine Normal Normal mg/dL    Blood, Urine OVER (3+) (A) NEGATIVE    Ketones, Urine 10 (1+) (A) NEGATIVE mg/dL    Bilirubin, Urine NEGATIVE NEGATIVE    Urobilinogen, Urine Normal Normal mg/dL    Nitrite, Urine NEGATIVE NEGATIVE    Leukocyte Esterase, Urine 500 Winifred/µL (A) NEGATIVE   Microscopic Only, Urine   Result Value Ref Range    WBC, Urine >50 (A) 1-5, NONE /HPF    WBC Clumps, Urine FEW Reference range not established. /HPF    RBC, Urine >20 (A) NONE, 1-2, 3-5 /HPF    Budding Yeast, Urine PRESENT (A) NONE /HPF   Blood Gas Lactic Acid, Venous   Result Value Ref Range    POCT Lactate, Venous 1.0 0.4 - 2.0 mmol/L          Assessment/Plan   Principal Problem:    Fall, initial encounter  Active Problems:    Fall    UTI (urinary tract infection)    1) Urinary tract infection  IV antibiotic coverage with ceftriaxone.    Urinalysis indicates budding yeast was present, unclear if  colonization or true yeast infection.  Await urine culture results, await blood culture results    2) Fall  Mechanical fall while attempting to sit on the bed  PT/OT     3) Leukocytosis  Due to UTI    CODE STATUS was discussed with the patient's daughter who is also his power of .  DNR Comfort Care arrest, no intubation or mechanical ventilation.  Per his daughter, the patient has been enrolled in hospice last year in order to enable him to receive services and reduce visits to the hospital.  Hospice was suspended at the time the patient was brought in to the emergency department.       Ghada Hernandez MD

## 2023-10-23 ENCOUNTER — PHARMACY VISIT (OUTPATIENT)
Dept: PHARMACY | Facility: CLINIC | Age: 88
End: 2023-10-23
Payer: COMMERCIAL

## 2023-10-23 LAB
ANION GAP SERPL CALC-SCNC: 8 MMOL/L
BUN SERPL-MCNC: 26 MG/DL (ref 8–25)
CALCIUM SERPL-MCNC: 9.1 MG/DL (ref 8.5–10.4)
CHLORIDE SERPL-SCNC: 105 MMOL/L (ref 97–107)
CO2 SERPL-SCNC: 26 MMOL/L (ref 24–31)
CREAT SERPL-MCNC: 1.1 MG/DL (ref 0.4–1.6)
ERYTHROCYTE [DISTWIDTH] IN BLOOD BY AUTOMATED COUNT: 13.2 % (ref 11.5–14.5)
GFR SERPL CREATININE-BSD FRML MDRD: 61 ML/MIN/1.73M*2
GLUCOSE BLD MANUAL STRIP-MCNC: 98 MG/DL (ref 74–99)
GLUCOSE SERPL-MCNC: 103 MG/DL (ref 65–99)
HCT VFR BLD AUTO: 38.5 % (ref 41–52)
HGB BLD-MCNC: 12.5 G/DL (ref 13.5–17.5)
MCH RBC QN AUTO: 29.8 PG (ref 26–34)
MCHC RBC AUTO-ENTMCNC: 32.5 G/DL (ref 32–36)
MCV RBC AUTO: 92 FL (ref 80–100)
NRBC BLD-RTO: 0 /100 WBCS (ref 0–0)
PLATELET # BLD AUTO: 270 X10*3/UL (ref 150–450)
PMV BLD AUTO: 9.8 FL (ref 7.5–11.5)
POTASSIUM SERPL-SCNC: 4.7 MMOL/L (ref 3.4–5.1)
RBC # BLD AUTO: 4.2 X10*6/UL (ref 4.5–5.9)
SODIUM SERPL-SCNC: 139 MMOL/L (ref 133–145)
WBC # BLD AUTO: 10.6 X10*3/UL (ref 4.4–11.3)

## 2023-10-23 PROCEDURE — 2500000001 HC RX 250 WO HCPCS SELF ADMINISTERED DRUGS (ALT 637 FOR MEDICARE OP): Performed by: INTERNAL MEDICINE

## 2023-10-23 PROCEDURE — 1210000001 HC SEMI-PRIVATE ROOM DAILY

## 2023-10-23 PROCEDURE — 2500000004 HC RX 250 GENERAL PHARMACY W/ HCPCS (ALT 636 FOR OP/ED): Performed by: INTERNAL MEDICINE

## 2023-10-23 PROCEDURE — 36415 COLL VENOUS BLD VENIPUNCTURE: CPT | Performed by: INTERNAL MEDICINE

## 2023-10-23 PROCEDURE — 96372 THER/PROPH/DIAG INJ SC/IM: CPT | Performed by: INTERNAL MEDICINE

## 2023-10-23 PROCEDURE — 97161 PT EVAL LOW COMPLEX 20 MIN: CPT | Mod: GP

## 2023-10-23 PROCEDURE — 80048 BASIC METABOLIC PNL TOTAL CA: CPT | Performed by: INTERNAL MEDICINE

## 2023-10-23 PROCEDURE — 97166 OT EVAL MOD COMPLEX 45 MIN: CPT | Mod: GO

## 2023-10-23 PROCEDURE — 82947 ASSAY GLUCOSE BLOOD QUANT: CPT

## 2023-10-23 PROCEDURE — 2500000005 HC RX 250 GENERAL PHARMACY W/O HCPCS: Performed by: INTERNAL MEDICINE

## 2023-10-23 PROCEDURE — 97530 THERAPEUTIC ACTIVITIES: CPT | Mod: GP

## 2023-10-23 PROCEDURE — 85027 COMPLETE CBC AUTOMATED: CPT | Performed by: INTERNAL MEDICINE

## 2023-10-23 RX ORDER — GABAPENTIN 100 MG/1
100 CAPSULE ORAL 2 TIMES DAILY
Qty: 60 CAPSULE | Refills: 0 | Status: SHIPPED | OUTPATIENT
Start: 2023-10-23

## 2023-10-23 RX ORDER — CEPHALEXIN 250 MG/1
250 CAPSULE ORAL 3 TIMES DAILY
Qty: 21 CAPSULE | Refills: 0 | Status: SHIPPED | OUTPATIENT
Start: 2023-10-23

## 2023-10-23 RX ORDER — LIDOCAINE 560 MG/1
1 PATCH PERCUTANEOUS; TOPICAL; TRANSDERMAL DAILY
Qty: 30 PATCH | Refills: 0 | Status: SHIPPED | OUTPATIENT
Start: 2023-10-23

## 2023-10-23 RX ORDER — ACETAMINOPHEN 325 MG/1
650 TABLET ORAL 3 TIMES DAILY
Qty: 30 TABLET | Refills: 0 | Status: SHIPPED | OUTPATIENT
Start: 2023-10-23

## 2023-10-23 RX ORDER — POLYETHYLENE GLYCOL 3350 17 G/17G
17 POWDER, FOR SOLUTION ORAL DAILY
Qty: 238 G | Refills: 30 | Status: SHIPPED | OUTPATIENT
Start: 2023-10-23

## 2023-10-23 RX ORDER — CEFDINIR 300 MG/1
300 CAPSULE ORAL 2 TIMES DAILY
Status: DISCONTINUED | OUTPATIENT
Start: 2023-10-23 | End: 2023-10-24 | Stop reason: HOSPADM

## 2023-10-23 RX ADMIN — CEFDINIR 300 MG: 300 CAPSULE ORAL at 20:45

## 2023-10-23 RX ADMIN — LIDOCAINE 1 PATCH: 4 PATCH TOPICAL at 20:45

## 2023-10-23 RX ADMIN — GABAPENTIN 100 MG: 100 CAPSULE ORAL at 09:17

## 2023-10-23 RX ADMIN — GABAPENTIN 100 MG: 100 CAPSULE ORAL at 20:45

## 2023-10-23 RX ADMIN — TAMSULOSIN HYDROCHLORIDE 0.4 MG: 0.4 CAPSULE ORAL at 09:18

## 2023-10-23 RX ADMIN — ACETAMINOPHEN 650 MG: 160 SOLUTION ORAL at 15:24

## 2023-10-23 RX ADMIN — TRAMADOL HYDROCHLORIDE 25 MG: 50 TABLET ORAL at 09:26

## 2023-10-23 RX ADMIN — DOCUSATE SODIUM 100 MG: 100 CAPSULE, LIQUID FILLED ORAL at 09:17

## 2023-10-23 RX ADMIN — ACETAMINOPHEN 650 MG: 325 TABLET ORAL at 21:09

## 2023-10-23 RX ADMIN — ENOXAPARIN SODIUM 40 MG: 40 INJECTION SUBCUTANEOUS at 09:18

## 2023-10-23 RX ADMIN — TRAMADOL HYDROCHLORIDE 25 MG: 50 TABLET ORAL at 18:25

## 2023-10-23 RX ADMIN — Medication 25 MCG: at 09:17

## 2023-10-23 RX ADMIN — CEFDINIR 300 MG: 300 CAPSULE ORAL at 15:24

## 2023-10-23 ASSESSMENT — PAIN - FUNCTIONAL ASSESSMENT
PAIN_FUNCTIONAL_ASSESSMENT: FLACC (FACE, LEGS, ACTIVITY, CRY, CONSOLABILITY)
PAIN_FUNCTIONAL_ASSESSMENT: FLACC (FACE, LEGS, ACTIVITY, CRY, CONSOLABILITY)
PAIN_FUNCTIONAL_ASSESSMENT: 0-10
PAIN_FUNCTIONAL_ASSESSMENT: 0-10

## 2023-10-23 ASSESSMENT — COGNITIVE AND FUNCTIONAL STATUS - GENERAL
PERSONAL GROOMING: A LITTLE
DAILY ACTIVITIY SCORE: 12
MOVING FROM LYING ON BACK TO SITTING ON SIDE OF FLAT BED WITH BEDRAILS: A LOT
MOBILITY SCORE: 8
DRESSING REGULAR UPPER BODY CLOTHING: A LOT
MOVING TO AND FROM BED TO CHAIR: TOTAL
TURNING FROM BACK TO SIDE WHILE IN FLAT BAD: A LOT
WALKING IN HOSPITAL ROOM: TOTAL
TURNING FROM BACK TO SIDE WHILE IN FLAT BAD: A LOT
CLIMB 3 TO 5 STEPS WITH RAILING: TOTAL
DRESSING REGULAR LOWER BODY CLOTHING: TOTAL
EATING MEALS: A LITTLE
WALKING IN HOSPITAL ROOM: TOTAL
DAILY ACTIVITIY SCORE: 12
TOILETING: TOTAL
CLIMB 3 TO 5 STEPS WITH RAILING: TOTAL
STANDING UP FROM CHAIR USING ARMS: TOTAL
STANDING UP FROM CHAIR USING ARMS: TOTAL
DRESSING REGULAR UPPER BODY CLOTHING: A LOT
EATING MEALS: A LITTLE
MOVING FROM LYING ON BACK TO SITTING ON SIDE OF FLAT BED WITH BEDRAILS: A LOT
MOVING TO AND FROM BED TO CHAIR: TOTAL
HELP NEEDED FOR BATHING: A LOT
PERSONAL GROOMING: A LITTLE
DRESSING REGULAR LOWER BODY CLOTHING: TOTAL
TOILETING: TOTAL
HELP NEEDED FOR BATHING: A LOT
MOBILITY SCORE: 8

## 2023-10-23 ASSESSMENT — PAIN SCALES - GENERAL
PAINLEVEL_OUTOF10: 9
PAINLEVEL_OUTOF10: 9
PAINLEVEL_OUTOF10: 3
PAINLEVEL_OUTOF10: 9
PAINLEVEL_OUTOF10: 8
PAINLEVEL_OUTOF10: 8
PAINLEVEL_OUTOF10: 0 - NO PAIN

## 2023-10-23 ASSESSMENT — ACTIVITIES OF DAILY LIVING (ADL)
ADL_ASSISTANCE: NEEDS ASSISTANCE
BATHING_ASSISTANCE: MAXIMAL

## 2023-10-23 NOTE — CONSULTS
"ConsultsINFECTIOUS DISEASES CONSULTATION NOTE      Referred by LIVAN Eric MD    Reason For Consult  Urinary tract infection, fungal    History Of Present Illness  Boaz Bowens is a 89 y.o. male presenting with a fall.  Details can be found in the admission history and physical examination.  In the emergency room the patient was found to have leukocytosis 17,800 urinalysis showed pyuria and budding yeast.  A single blood culture was collected, the urine was not cultured, and the patient was started on ceftriaxone.  Leukocytosis resolved rapidly and he is not systemically ill.  Consultation is requested concerning the finding of budding yeast on the urine analysis.    Patient is daughter is present at the bedside and provides additional history.  She states that he has history of urethral cancer and is also had prostatic enlargement and impaired bladder emptying.  He was doing self-catheterization for a long time, but has not been able to do so for several years.  Often when he has a he has a urine analysis, it \"shows an infection.\"  On occasion the hospice personnel caring for him note transient painless hematuria.  He does not complain of abdominal pain, dysuria, flank pain, nausea, vomiting, fever, or rigors.  Does state that he typically voids frequently, very small amounts of urine     Past Medical History  He has a past medical history of Cancer (CMS/HCC), Frequent urinary tract infections, and Urethral cancer (CMS/HCC).    Surgical History  He has a past surgical history that includes Appendectomy; Total knee arthroplasty (Right); Total knee arthroplasty (Left); and Hemorrhoid surgery.     Social History  Tobacco use: Former smoker  Alcohol use: Does not abuse alcohol    Family History  Not pertinent to the current question    Allergies  Patient has no known allergies.     Review of Systems  Detailed review of systems completed.  No significant additional positives beyond what is mentioned above    Physical " Exam  Vital signs:  Visit Vitals  /61 (BP Location: Right arm, Patient Position: Lying)   Pulse 55   Temp 36.3 °C (97.3 °F) (Oral)   Resp 17      General: Elderly man in no acute distress, examined in the presence of his daughter  HEENT:  No scleral icterus or conjunctival suffusion, oral mucosa moist  Nodes:  Negative  Lungs:  Clear to auscultation  Heart:  S1, S2 normal, no pathologic murmur appreciated  Abdomen:  Soft, nontender. No palpable organs or masses  Back: No spinal tenderness.  Tender over the left costal margin presumably related to rib fractures  Genitalia:  Not examined  Extremities:  No cords, phlebitis, cellulitis  Neurologic:  Alert.  Grossly non-focal.  No meningismus    Relevant Results  Results from last 72 hours   Lab Units 10/23/23  0523 10/21/23  1949   WBC AUTO x10*3/uL 10.6 17.8*   HEMOGLOBIN g/dL 12.5* 13.8   HEMATOCRIT % 38.5* 40.7*   PLATELETS AUTO x10*3/uL 270 302   NEUTROS PCT AUTO %  --  86.4   LYMPHS PCT AUTO %  --  7.1   MONOS PCT AUTO %  --  5.0   EOS PCT AUTO %  --  0.7     Results from last 72 hours   Lab Units 10/23/23  0523   CREATININE mg/dL 1.10   ANION GAP mmol/L 8   EGFR mL/min/1.73m*2 61         Urinalysis: Pyuria, budding yeast  Microbiology:  Blood (10/21): Negative  No urine culture submitted      ASSESSMENT:  Pyuria, funguria  Patient has a known history of urinary retention, and it is not surprising that his urine should demonstrate WBC and he may well be colonized with yeast as well.  No urine culture was performed.  Patient is essentially asymptomatic and his leukocytosis resolved rapidly.  It is difficult to know whether the leukocytosis was a manifestation of urinary tract infection or simply a reactive leukocytosis.  Discussed in detail with the patient and his daughter.  So long as the current situation is an acceptable one, I would not pursue further evaluation of his urinary retention or extensive treatment with antibiotic therapy.  Should he develop  irritative voiding symptoms, unexplained leukocytosis, fever, then I would certainly do further evaluation with a formal voiding trial and consideration of catheter drainage    PLANS:  -   Change empiric antibiotic therapy to cefdinir for 5 more days  -   Would not pursue any additional evaluation of the funguria or urinary retention at this time, see above    Findings discussed in detail with the patient and daughter at the bedside     THANK YOU FOR ASKING ME TO ASSIST YOU IN THE CARE OF YOUR PATIENT    Kota Sung MD  [facility]  Office:  [phone]

## 2023-10-23 NOTE — CARE PLAN
Problem: PT Problem  Goal: Patient will ambulate 5 distance using walker with moderate assist  Outcome: Not Progressing  Note: Patient will ambulate 5 distance using walker with moderate assist  Goal: Patient will perform chair to and from bed transfer with moderate assist  Outcome: Not Progressing  Note: Patient will perform chair to and from bed transfer with moderate assist     Problem: PT Problem  Goal: Patient will ambulate 5 distance using walker with moderate assist  Outcome: Not Progressing  Note: Patient will ambulate 5 distance using walker with moderate assist  Goal: Patient will perform chair to and from bed transfer with moderate assist  Outcome: Not Progressing  Note: Patient will perform chair to and from bed transfer with moderate assist

## 2023-10-23 NOTE — PROGRESS NOTES
Spiritual Care Visit    Clinical Encounter Type  Visited With: Patient  Routine Visit: Introduction  Continue Visiting: Yes         Values/Beliefs  Spiritual Requests During Hospitalization: Anointing & Communion today in ER    Sacramental Encounters  Communion: Patient wants communion  Communion Given Indicator: Yes  Sacrament of Sick-Anointing: Anointed     Eduardo Rankin

## 2023-10-23 NOTE — PROGRESS NOTES
Occupational Therapy    Evaluation    Patient Name: Boaz Bowens  MRN: [id number]  Today's Date: 10/23/2023  Time Calculation  Start Time: 0826  Stop Time: 0850  Time Calculation (min): 24 min    Assessment  IP OT Assessment  OT Assessment: OT order received, chart reviewed, evaluation completed. Pt demonstrated deficits in transfer, functional mobility, and ADLs, singificantly impaired by pain. Would benefit from acute OT services.  Prognosis: Fair  Evaluation/Treatment Tolerance: Patient limited by pain  Medical Staff Made Aware: Yes  End of Session Communication: Bedside nurse  End of Session Patient Position: Bed, 3 rail up, Alarm on  Plan:  Treatment Interventions: ADL retraining, Functional transfer training, UE strengthening/ROM, Endurance training, Cognitive reorientation, Patient/family training, Equipment evaluation/education  OT Frequency: 3 times per week  OT Discharge Recommendations: Moderate intensity level of continued care    Subjective   Current Problem:  1. Fall, initial encounter        2. UTI (urinary tract infection), uncomplicated        3. Injury of head, initial encounter          General:  General  Reason for Referral: Impaired mobility;  fall  Referred By: Mary  Past Medical History Relevant to Rehab: camryn TKA,dyslipedemia, uretheral CA, disorientation  Family/Caregiver Present: No  Co-Treatment: PT  Co-Treatment Reason: Need for 2nd skilled person for therapeutic patient handling;  low patient tolerance due to pain  Prior to Session Communication: Bedside nurse  Patient Position Received: Bed, 3 rail up, Alarm on  Preferred Learning Style: verbal  General Comment: 89 year old male admit from [place] independent living due to fall;  chest Xray indicates acute medially angulated fracture L ribs 8-10  Precautions:  Medical Precautions: Fall precautions, Other (comment) (rib fractures)  Vital Signs:     Pain:  Pain Assessment  Pain Assessment: 0-10  Pain Score: 9  Pain Type: Acute  pain  Pain Location: Rib cage  Pain Orientation: Left    Objective   Cognition:  Overall Cognitive Status: Impaired, Impaired at baseline  Orientation Level: Disoriented to place, Disoriented to time, Disoriented to situation  Memory:  (impaired STM, intact LTM repeating his life story throughout)  Safety/Judgement:  (impaired)  Insight: Moderate (fearful of falling/moving)           Home Living:  Type of Home:  ([place] with 24/7 Hospice services that were revoked for admission)  Lives With: Alone  Home Adaptive Equipment: Walker rolling or standard  Home Layout: One level  Home Access: Elevator  Bathroom Shower/Tub: Tub/shower unit  Bathroom Equipment: Grab bars in shower, Tub transfer bench, Raised toilet seat with rails  Home Living Comments: 24/7 assist per [facility]   Prior Function:  Level of [place]: Needs assistance with ADLs, Needs assistance with homemaking  Receives Help From: Personal care attendant  ADL Assistance: Needs assistance  Ambulatory Assistance: Needs assistance  Prior Function Comments: Unable to accurately assess prior level of function due to patient's impaired cognition  IADL History:     ADL:  Eating Deficit: Setup  Grooming Assistance: Minimal  Bathing Assistance: Maximal  UE Dressing Assistance: Maximal  LE Dressing Assistance: Total  Toileting Assistance with Device: Total  Activity Tolerance:  Endurance: Decreased tolerance for upright activites (increased pain with moving)  Bed Mobility/Transfers: Bed Mobility  Bed Mobility: Yes  Bed Mobility 1  Bed Mobility 1: Supine to sitting  Level of Assistance 1: Maximum assistance (x2)  Bed Mobility Comments 1: Pt able to initiate B les movement to EOB but ultimately required max A x2 for supine to sit EOB and scoot to EOB, Initally required mod A to maintain static sitting balance, able to progress to close S.  Bed Mobility 2  Bed Mobility  2: Sitting to supine  Level of Assistance 2: Dependent (x2)  Bed Mobility Comments 2: Pt  declined attempt to stand, loses balance posteriorly required dep x2 for safety and dep x2 for scoot to HOB. repositioned with needs in reach bed alamr on for safety.   and Transfers  Transfer: No (Pt declined attempt at stand due to pain)  Modalities:     IADL's:      Vision: Vision - Basic Assessment  Current Vision: No visual deficits  Sensation:  Light Touch: No apparent deficits  Strength:  Strength Comments: not formally tested due to rib fractures. Painful with Ue movement  Perception:  Inattention/Neglect: Appears intact  Coordination:  Movements are Fluid and Coordinated: No  Upper Body Coordination: slow and guarded   Hand Function:  Hand Function  Gross Grasp: Functional  Coordination: Impaired  Extremities: RUE   RUE : Exceptions to WFL  RUE AROM (degrees)  R Shoulder Flexion  0-170: 90 Degrees  RUE Strength  RUE Overall Strength: Deficits  R Shoulder Flexion: 2/5 and LUE   LUE: Exceptions to WFL  LUE AROM (degrees)  L Shoulder Flexion  0-170: 90 Degrees  LUE Strength  LUE Overall Strength: Deficits  L Shoulder Flexion: 2/5    Outcome Measures: [facility] Daily Activity  Putting on and taking off regular lower body clothing: Total  Bathing (including washing, rinsing, drying): A lot  Putting on and taking off regular upper body clothing: A lot  Toileting, which includes using toilet, bedpan or urinal: Total  Taking care of personal grooming such as brushing teeth: A little  Eating Meals: A little  Daily Activity - Total Score: 12      Education Documentation  ADL Training, taught by Lydia Worthy OT at 10/23/2023  9:19 AM.  Learner: Patient  Readiness: Acceptance  Method: Explanation  Response: Verbalizes Understanding  Comment: Educated on POC    Education Comments  No comments found.      Goals:   Encounter Problems       Encounter Problems (Active)       OT Goals       Pt will perform functional mobility household distance at min A level with RW  (Progressing)       Start:  10/23/23    Expected End:   11/10/23            Pt will complete ADL tasks with min A using AE as needed, in order to complete self-care tasks.  (Progressing)       Start:  10/23/23    Expected End:  11/10/23            Pt will perform functional transfers at min A level with Rw  (Progressing)       Start:  10/23/23    Expected End:  11/10/23            Pt will perform GENTLE upper body therapeutic exercises all joints/planes of motion with close S, NO RESISTANCE (Progressing)       Start:  10/23/23    Expected End:  11/10/23

## 2023-10-23 NOTE — DISCHARGE SUMMARY
Discharge Diagnosis  Fall, initial encounter    Issues Requiring Follow-Up  Waiting pre-[facility] skilled nursing facility    Discharge Meds     Your medication list        START taking these medications        Instructions Last Dose Given Next Dose Due   acetaminophen 325 mg tablet  Commonly known as: Tylenol      Take 2 tablets (650 mg) by mouth 3 times a day.       gabapentin 100 mg capsule  Commonly known as: Neurontin      Take 1 capsule (100 mg) by mouth 2 times a day.       lidocaine 4 % patch      Place 1 patch over 12 hours on the skin once daily. Remove & discard patch within 12 hours or as directed by MD.       polyethylene glycol packet  Commonly known as: Glycolax, Miralax      Take 17 g by mouth once daily.              CONTINUE taking these medications        Instructions Last Dose Given Next Dose Due   Centrum Silver  Generic drug: multivitamin with minerals iron-free           cholecalciferol 25 MCG (1000 UT) capsule  Commonly known as: Vitamin D-3           Flomax 0.4 mg 24 hr capsule  Generic drug: tamsulosin           Stool Softener 100 mg tablet  Generic drug: docusate sodium                     Where to Get Your Medications        These medications were sent to [facility]  [id number] [address]      Hours: 9 AM to 6 PM Mon-Fri, 9 AM to 1 PM Sat Phone: [phone]   acetaminophen 325 mg tablet  gabapentin 100 mg capsule  lidocaine 4 % patch  polyethylene glycol packet         Test Results Pending At Discharge  Pending Labs       Order Current Status    Blood Culture Preliminary result            Hospital Course   89-year-old  male resents to the hospital after mechanical fall x-rays revealing rib fractures of 8 9 and 10 without atelectasis or pneumonia or pneumothorax strays and CTs of the spine negative for fracture participating with physical and Occupational Therapy should not lives at [facility] but will require acute rehab prior to  returning to his usual status at [place] certification is a pending    Pertinent Physical Exam At Time of Discharge  Physical Exam  Vitals and nursing note reviewed.   Constitutional:       Appearance: Normal appearance.   HENT:      Head: Normocephalic and atraumatic.      Mouth/Throat:      Mouth: Mucous membranes are moist.   Eyes:      Extraocular Movements: Extraocular movements intact.      Pupils: Pupils are equal, round, and reactive to light.   Cardiovascular:      Rate and Rhythm: Normal rate and regular rhythm.   Pulmonary:      Effort: Pulmonary effort is normal.      Breath sounds: Normal breath sounds.   Abdominal:      General: Abdomen is flat.      Palpations: Abdomen is soft.   Musculoskeletal:         General: Normal range of motion.      Cervical back: Normal range of motion and neck supple.   Skin:     General: Skin is warm.      Capillary Refill: Capillary refill takes less than 2 seconds.   Neurological:      General: No focal deficit present.      Mental Status: He is alert and oriented to person, place, and time. Mental status is at baseline.   Psychiatric:         Mood and Affect: Mood normal.         Outpatient Follow-Up  No future appointments.      Kobe Eric DO   Cephalexin Counseling: I counseled the patient regarding use of cephalexin as an antibiotic for prophylactic and/or therapeutic purposes. Cephalexin (commonly prescribed under brand name Keflex) is a cephalosporin antibiotic which is active against numerous classes of bacteria, including most skin bacteria. Side effects may include nausea, diarrhea, gastrointestinal upset, rash, hives, yeast infections, and in rare cases, hepatitis, kidney disease, seizures, fever, confusion, neurologic symptoms, and others. Patients with severe allergies to penicillin medications are cautioned that there is about a 10% incidence of cross-reactivity with cephalosporins. When possible, patients with penicillin allergies should use alternatives to cephalosporins for antibiotic therapy.

## 2023-10-23 NOTE — NURSING NOTE
Patient back into bed after Xray, positioned for comfort and safety, male purewick in place for incontinence, medicated again for pain per request, bed alarm set and call light within reach

## 2023-10-23 NOTE — NURSING NOTE
Assumed care of the patient.  Patient c/o pain in left side.  Tramadol administered per orders.  Patient incontinent of urine.  Patient provided bath and new bed linens.  Spoke with daughter and provided update.  Daughter would like patient to return to [place] under Hospice care.

## 2023-10-23 NOTE — NURSING NOTE
Patient resting in bed with eyes closed, respirations even and unlabored, no changes in previous assessment, male purewick in place, bed alarm set and call light within reach

## 2023-10-23 NOTE — CARE PLAN
The patient's goals for the shift include no falls and pain control    The clinical goals for the shift include remain safe

## 2023-10-23 NOTE — NURSING NOTE
Spoke with patient's daughter Silvia on the phone, update given per request, call back number [phone] requested be put in the chart

## 2023-10-23 NOTE — PROGRESS NOTES
Physical Therapy    Physical Therapy Evaluation & Treatment    Patient Name: Boaz Bowens  MRN: [id number]  Today's Date: 10/23/2023   Time Calculation  Start Time: 0825  Stop Time: 0850  Time Calculation (min): 25 min    Assessment/Plan   PT Assessment  PT Assessment Results: Decreased strength, Decreased endurance, Impaired balance, Decreased mobility, Decreased coordination, Decreased safety awareness  Rehab Prognosis: Fair  Evaluation/Treatment Tolerance: Patient limited by pain  Barriers to Participation: Ability to acquire knowledge, Insight into problems  End of Session Communication: Bedside nurse  Assessment Comment: Back pain limiting tolerance to mobility;  RN notified.  End of Session Patient Position: Bed, 3 rail up, Alarm on  IP OR SWING BED PT PLAN  Inpatient or Swing Bed: Inpatient  PT Plan  Treatment/Interventions: Bed mobility, Transfer training, Gait training, Balance training, Strengthening, Endurance training, Therapeutic exercise, Therapeutic activity  PT Plan: Skilled PT  PT Frequency: 2 times per week  PT Discharge Recommendations: Moderate intensity level of continued care  PT Recommended Transfer Status: Total assist      Subjective     General Visit Information:  General  Reason for Referral: Impaired mobility;  fall  Past Medical History Relevant to Rehab: camryn TKA,dyslipedemia, uretheral CA, disorientation  Co-Treatment: OT  Co-Treatment Reason: Need for 2nd skilled person for therapeutic patient handling;  low patient tolerance due to pain  Prior to Session Communication: Bedside nurse  Patient Position Received: Bed, 3 rail up, Alarm on  General Comment: 89 year old male admit from [facility] due to fall;  chest Xray indicates acute medially angulated fractures 8-10 ribs on left.  Home Living:  Home Living  Type of Home: Apartment  Lives With: Alone  Home Layout: One level  Home Access: Elevator  Bathroom Shower/Tub: Tub/shower unit  Bathroom Equipment: Grab bars in  shower, Tub transfer bench, Raised toilet seat with rails  Home Living Comments: Patient reportedly has 24 hour hired care givers (Home environment info abstracted from previous medical records)  Prior Level of Function:  Prior Function Per Pt/Caregiver Report  Ambulatory Assistance: Needs assistance (walker)  Prior Function Comments: Unable to accurately assess prior level of function due to patient's impaired cognition  Precautions:  Precautions  Hearing/Visual Limitations: hard of hearing  Medical Precautions: Fall precautions  Precautions Comment: Patient fearful of falling  Vital Signs:       Objective   Pain:  Pain Assessment  Pain Assessment: FLACC (Face, Legs, Activity, Cry, Consolability)  Pain Score: 8  Pain Location: Back  Pain Interventions:  (RN reports patient premedicated for pain prior to evaluation)  Cognition:  Cognition  Overall Cognitive Status: Impaired, Impaired at baseline  Orientation Level: Disoriented to place, Disoriented to time, Disoriented to situation    General Assessments:                Activity Tolerance  Endurance: Decreased tolerance for upright activites (Pain limiting tolerance to activity)  Activity Tolerance Comments: Patient yelling out in pain during mobility    Sensation  Light Touch: No apparent deficits    Coordination  Movements are Fluid and Coordinated: No  Lower Body Coordination: All movements are slow and guarded due to pain  Trunk Coordination: Slow and guarded due to pain    Static Sitting Balance  Static Sitting-Balance Support: Bilateral upper extremity supported  Static Sitting-Level of Assistance: Moderate assistance  Static Sitting-Comment/Number of Minutes: Patient impulsively retropulsing due to pain while sitting on side of bed required mod assist for support.       Functional Assessments:  Bed Mobility  Bed Mobility: Yes  Bed Mobility 1  Bed Mobility 1: Supine to sitting  Level of Assistance 1: Maximum assistance (Max assist x 2 persons)  Bed Mobility  Comments 1: Assist with trunk-up and camryn LE during supine to sit.  Bed Mobility 2  Bed Mobility  2: Sitting to supine  Level of Assistance 2: Maximum assistance (Max assist x 2 persons)  Bed Mobility Comments 2: Assist with trunk and camryn LE during sit to supine;  all movements are slow and guarded due to pain.    Transfers  Transfer: No    Ambulation/Gait Training  Ambulation/Gait Training Performed: No    Stairs  Stairs: No  Extremity/Trunk Assessments:  RLE   RLE : Exceptions to WFL  Strength RLE  R Hip Flexion: 2+/5  R Knee Extension: 3/5  R Ankle Dorsiflexion: 3+/5  LLE   LLE : Exceptions to WFL  Strength LLE  L Hip Flexion: 2+/5  L Knee Extension: 3/5  L Ankle Dorsiflexion: 3+/5  Treatments:  Therapeutic Activity  Therapeutic Activity Performed: Yes  Therapeutic Activity 1: Sitting balance on side of bed with camryn UE support x 4 mintues.    Bed Mobility  Bed Mobility: Yes  Bed Mobility 1  Bed Mobility 1: Supine to sitting  Level of Assistance 1: Maximum assistance (Max assist x 2 persons)  Bed Mobility Comments 1: Assist with trunk-up and camryn LE during supine to sit.  Bed Mobility 2  Bed Mobility  2: Sitting to supine  Level of Assistance 2: Maximum assistance (Max assist x 2 persons)  Bed Mobility Comments 2: Assist with trunk and camryn LE during sit to supine;  all movements are slow and guarded due to pain.    Ambulation/Gait Training  Ambulation/Gait Training Performed: No  Transfers  Transfer: No    Stairs  Stairs: No       Outcome Measures:  [facility] Basic Mobility  Turning from your back to your side while in a flat bed without using bedrails: A lot  Moving from lying on your back to sitting on the side of a flat bed without using bedrails: A lot  Moving to and from bed to chair (including a wheelchair): Total  Standing up from a chair using your arms (e.g. wheelchair or bedside chair): Total  To walk in hospital room: Total  Climbing 3-5 steps with railing: Total  Basic Mobility - Total Score: 8    Encounter  Problems       Encounter Problems (Active)       PT Problem       Patient will ambulate 5 distance using walker with moderate assist (Not Progressing)       Start:  10/23/23    Expected End:  11/06/23         Goal Note       Patient will ambulate 5 distance using walker with moderate assist              Patient will perform chair to and from bed transfer with moderate assist (Not Progressing)       Start:  10/23/23    Expected End:  11/06/23         Goal Note       Patient will perform chair to and from bed transfer with moderate assist              Patient will perform supine to sit on bed with moderate assist demonstrating control (Progressing)       Start:  10/23/23    Expected End:  11/06/23         Goal Note       Patient will perform supine to sit on bed with moderate assist demonstrating control              Patient will perform sit to supine on bed with moderate assist demonstrating control (Progressing)       Start:  10/23/23    Expected End:  11/06/23         Goal Note       Patient will perform sit to supine on bed with moderate assist demonstrating control                     Education Documentation  Mobility Training, taught by Kota Garcia, PT at 10/23/2023  9:17 AM.  Learner: Patient  Readiness: Nonacceptance  Method: Explanation, Demonstration  Response: Needs Reinforcement    Education Comments  No comments found.

## 2023-10-23 NOTE — PROGRESS NOTES
Patient not medically clear. TCC spoke to patient's daughter, Silvia, regarding discharge. Silvia agreeable to short term rehab and chose [facility]. TCC sent referral. Waiting for facility review. No precert needed. Will follow.     UPDATE 1520: [facility] still reviewing and patient will need three midnights prior to discharge. 7000 not completed.     **PATIENT DOES NOT HAVE A SAFE DISCHARGE PLAN      Ara Chambers RN

## 2023-10-23 NOTE — PROGRESS NOTES
The patient complained of pain in the left lateral lower chest to left upper abdomen region.  Semination, awake, alert, there was tenderness over the anterior and lateral aspect of the left lower ribs.  There was no left upper quadrant abdominal tenderness and no tenderness elsewhere in the abdomen.    Assessment  Left lower lateral chest wall pain.     Plan  X-ray of the ribs, left side  Lidocaine patch to be applied daily to the area  Ibuprofen x 1, gabapentin 100 mg BID, tramadol 25 mg every 8 hours as needed for pain

## 2023-10-23 NOTE — CARE PLAN
The patient's goals for the shift include no falls    The clinical goals for the shift include no falls, pain control    Problem: Fall/Injury  Goal: Not fall by end of shift  Outcome: Progressing  Goal: Be free from injury by end of the shift  Outcome: Progressing  Goal: Verbalize understanding of personal risk factors for fall in the hospital  Outcome: Progressing  Goal: Verbalize understanding of risk factor reduction measures to prevent injury from fall in the home  Outcome: Progressing  Goal: Use assistive devices by end of the shift  Outcome: Progressing  Goal: Pace activities to prevent fatigue by end of the shift  Outcome: Progressing     Problem: Pain  Goal: Takes deep breaths with improved pain control throughout the shift  Outcome: Progressing  Goal: Turns in bed with improved pain control throughout the shift  Outcome: Progressing  Goal: Walks with improved pain control throughout the shift  Outcome: Progressing  Goal: Performs ADL's with improved pain control throughout shift  Outcome: Progressing  Goal: Participates in PT with improved pain control throughout the shift  Outcome: Progressing     Problem: Skin  Goal: Participates in plan/prevention/treatment measures  Outcome: Progressing  Goal: Prevent/manage excess moisture  Outcome: Progressing  Goal: Prevent/minimize sheer/friction injuries  Outcome: Progressing  Goal: Promote/optimize nutrition  Outcome: Progressing  Goal: Promote skin healing  Outcome: Progressing

## 2023-10-24 VITALS
RESPIRATION RATE: 18 BRPM | HEIGHT: 70 IN | BODY MASS INDEX: 22.9 KG/M2 | DIASTOLIC BLOOD PRESSURE: 54 MMHG | TEMPERATURE: 97.8 F | WEIGHT: 160 LBS | OXYGEN SATURATION: 98 % | HEART RATE: 56 BPM | SYSTOLIC BLOOD PRESSURE: 128 MMHG

## 2023-10-24 PROCEDURE — 2500000001 HC RX 250 WO HCPCS SELF ADMINISTERED DRUGS (ALT 637 FOR MEDICARE OP): Performed by: INTERNAL MEDICINE

## 2023-10-24 PROCEDURE — 96372 THER/PROPH/DIAG INJ SC/IM: CPT | Performed by: INTERNAL MEDICINE

## 2023-10-24 PROCEDURE — 97535 SELF CARE MNGMENT TRAINING: CPT | Mod: GO

## 2023-10-24 PROCEDURE — 2500000004 HC RX 250 GENERAL PHARMACY W/ HCPCS (ALT 636 FOR OP/ED): Performed by: INTERNAL MEDICINE

## 2023-10-24 RX ORDER — TRAMADOL HYDROCHLORIDE 50 MG/1
50 TABLET ORAL EVERY 8 HOURS PRN
Qty: 10 TABLET | Refills: 0 | Status: SHIPPED | OUTPATIENT
Start: 2023-10-24

## 2023-10-24 RX ORDER — TRAMADOL HYDROCHLORIDE 50 MG/1
50 TABLET ORAL EVERY 8 HOURS PRN
Qty: 10 TABLET | Refills: 0 | Status: SHIPPED | OUTPATIENT
Start: 2023-10-24 | End: 2023-10-24 | Stop reason: SDUPTHER

## 2023-10-24 RX ADMIN — TRAMADOL HYDROCHLORIDE 25 MG: 50 TABLET ORAL at 02:21

## 2023-10-24 RX ADMIN — ACETAMINOPHEN 650 MG: 325 TABLET ORAL at 09:11

## 2023-10-24 RX ADMIN — ACETAMINOPHEN 650 MG: 325 TABLET ORAL at 00:44

## 2023-10-24 RX ADMIN — GABAPENTIN 100 MG: 100 CAPSULE ORAL at 09:00

## 2023-10-24 RX ADMIN — Medication 25 MCG: at 09:00

## 2023-10-24 RX ADMIN — TRAMADOL HYDROCHLORIDE 25 MG: 50 TABLET ORAL at 10:40

## 2023-10-24 RX ADMIN — ACETAMINOPHEN 650 MG: 325 TABLET ORAL at 04:38

## 2023-10-24 RX ADMIN — TAMSULOSIN HYDROCHLORIDE 0.4 MG: 0.4 CAPSULE ORAL at 09:00

## 2023-10-24 RX ADMIN — ENOXAPARIN SODIUM 40 MG: 40 INJECTION SUBCUTANEOUS at 09:00

## 2023-10-24 RX ADMIN — ACETAMINOPHEN 650 MG: 325 TABLET ORAL at 13:24

## 2023-10-24 RX ADMIN — CEFDINIR 300 MG: 300 CAPSULE ORAL at 09:00

## 2023-10-24 RX ADMIN — DOCUSATE SODIUM 100 MG: 100 CAPSULE, LIQUID FILLED ORAL at 09:00

## 2023-10-24 ASSESSMENT — PAIN SCALES - GENERAL
PAINLEVEL_OUTOF10: 3
PAINLEVEL_OUTOF10: 10 - WORST POSSIBLE PAIN
PAINLEVEL_OUTOF10: 0 - NO PAIN
PAINLEVEL_OUTOF10: 0 - NO PAIN
PAINLEVEL_OUTOF10: 10 - WORST POSSIBLE PAIN
PAINLEVEL_OUTOF10: 5 - MODERATE PAIN
PAINLEVEL_OUTOF10: 6
PAINLEVEL_OUTOF10: 9
PAINLEVEL_OUTOF10: 2
PAINLEVEL_OUTOF10: 10 - WORST POSSIBLE PAIN
PAINLEVEL_OUTOF10: 6
PAINLEVEL_OUTOF10: 2
PAINLEVEL_OUTOF10: 8
PAINLEVEL_OUTOF10: 6
PAINLEVEL_OUTOF10: 3

## 2023-10-24 ASSESSMENT — COGNITIVE AND FUNCTIONAL STATUS - GENERAL
TURNING FROM BACK TO SIDE WHILE IN FLAT BAD: A LOT
EATING MEALS: A LITTLE
STANDING UP FROM CHAIR USING ARMS: TOTAL
DAILY ACTIVITIY SCORE: 13
DRESSING REGULAR LOWER BODY CLOTHING: TOTAL
CLIMB 3 TO 5 STEPS WITH RAILING: TOTAL
DAILY ACTIVITIY SCORE: 13
PERSONAL GROOMING: A LITTLE
PERSONAL GROOMING: A LITTLE
HELP NEEDED FOR BATHING: A LOT
DRESSING REGULAR UPPER BODY CLOTHING: A LOT
HELP NEEDED FOR BATHING: A LOT
TOILETING: A LOT
MOVING TO AND FROM BED TO CHAIR: A LOT
WALKING IN HOSPITAL ROOM: TOTAL
DRESSING REGULAR UPPER BODY CLOTHING: A LOT
DRESSING REGULAR LOWER BODY CLOTHING: TOTAL
TOILETING: A LOT
EATING MEALS: A LITTLE
MOBILITY SCORE: 9
MOVING FROM LYING ON BACK TO SITTING ON SIDE OF FLAT BED WITH BEDRAILS: A LOT

## 2023-10-24 ASSESSMENT — PAIN - FUNCTIONAL ASSESSMENT
PAIN_FUNCTIONAL_ASSESSMENT: 0-10
PAIN_FUNCTIONAL_ASSESSMENT: 0-10
PAIN_FUNCTIONAL_ASSESSMENT: FLACC (FACE, LEGS, ACTIVITY, CRY, CONSOLABILITY)
PAIN_FUNCTIONAL_ASSESSMENT: 0-10
PAIN_FUNCTIONAL_ASSESSMENT: FLACC (FACE, LEGS, ACTIVITY, CRY, CONSOLABILITY)
PAIN_FUNCTIONAL_ASSESSMENT: 0-10

## 2023-10-24 ASSESSMENT — PAIN DESCRIPTION - DESCRIPTORS: DESCRIPTORS: ACHING

## 2023-10-24 ASSESSMENT — PAIN SCALES - WONG BAKER: WONGBAKER_NUMERICALRESPONSE: HURTS WORST

## 2023-10-24 NOTE — NURSING NOTE
Order review at end of shift completed.  Patient asleep on bed with even and unlabored breathing.  Safety measures ensured and call light within  reach.  Plan of care on going.

## 2023-10-24 NOTE — CARE PLAN
The patient's goals for the shift include no falls    The clinical goals for the shift include Pain free, adequate rest and sleep, no falls    Over the shift, the patient did not make progress toward the following goals. Barriers to progression include . Recommendations to address these barriers include .      Problem: Fall/Injury  Goal: Not fall by end of shift  Outcome: Progressing  Goal: Be free from injury by end of the shift  Outcome: Progressing  Goal: Verbalize understanding of personal risk factors for fall in the hospital  Outcome: Progressing  Goal: Verbalize understanding of risk factor reduction measures to prevent injury from fall in the home  Outcome: Progressing  Goal: Use assistive devices by end of the shift  Outcome: Progressing  Goal: Pace activities to prevent fatigue by end of the shift  Outcome: Progressing     Problem: Pain  Goal: Takes deep breaths with improved pain control throughout the shift  Outcome: Progressing  Goal: Turns in bed with improved pain control throughout the shift  Outcome: Progressing  Goal: Walks with improved pain control throughout the shift  Outcome: Progressing  Goal: Performs ADL's with improved pain control throughout shift  Outcome: Progressing  Goal: Participates in PT with improved pain control throughout the shift  Outcome: Progressing     Problem: Skin  Goal: Participates in plan/prevention/treatment measures  Outcome: Progressing  Goal: Prevent/manage excess moisture  Outcome: Progressing  Goal: Prevent/minimize sheer/friction injuries  Outcome: Progressing  Goal: Promote/optimize nutrition  Outcome: Progressing  Goal: Promote skin healing  Outcome: Progressing

## 2023-10-24 NOTE — DISCHARGE SUMMARY
Discharge Diagnosis  Fall, initial encounter    Issues Requiring Follow-Up  Fall    Discharge Meds     Your medication list        START taking these medications        Instructions Last Dose Given Next Dose Due   acetaminophen 325 mg tablet  Commonly known as: Tylenol      Take 2 tablets (650 mg) by mouth 3 times a day.       cephalexin 250 mg capsule  Commonly known as: Keflex      Take 1 capsule (250 mg) by mouth 3 times a day.       gabapentin 100 mg capsule  Commonly known as: Neurontin      Take 1 capsule (100 mg) by mouth 2 times a day.       lidocaine 4 % patch      Place 1 patch over 12 hours on the skin once daily. Remove & discard patch within 12 hours or as directed by MD.       polyethylene glycol 17 gram/dose powder  Commonly known as: Glycolax, Miralax      Dissolve 17 g n 4-8 ounces of liquid & take by mouth once daily.       traMADol 50 mg tablet  Commonly known as: Ultram      Take 1 tablet (50 mg) by mouth every 8 hours if needed for moderate pain (4 - 6) for up to 5 days.              CONTINUE taking these medications        Instructions Last Dose Given Next Dose Due   Centrum Silver  Generic drug: multivitamin with minerals iron-free           cholecalciferol 25 MCG (1000 UT) capsule  Commonly known as: Vitamin D-3           Flomax 0.4 mg 24 hr capsule  Generic drug: tamsulosin           Stool Softener 100 mg tablet  Generic drug: docusate sodium                     Where to Get Your Medications        These [facility]  [id number] [facility] [address]      Hours: 9 AM to 6 PM Mon-Fri, 9 AM to 1 PM Sat Phone: [phone]   acetaminophen 325 mg tablet  cephalexin 250 mg capsule  gabapentin 100 mg capsule  lidocaine 4 % patch  polyethylene glycol 17 gram/dose powder       You can get these medications from any pharmacy    Bring a paper prescription for each of these medications  traMADol 50 mg tablet         Test Results Pending At  Discharge  Pending Labs       Order Current Status    Blood Culture Preliminary result            Hospital Course   89-year-old  male resents to the hospital after mechanical fall x-rays revealing rib fractures of 8 9 and 10 without atelectasis or pneumonia or pneumothorax strays and CTs of the spine negative for fracture participating with physical and Occupational Therapy should not lives at [facility] but will require acute rehab prior to returning to his usual status    Pertinent Physical Exam At Time of Discharge  Physical Exam  Constitutional:       Appearance: Normal appearance.   HENT:      Head: Normocephalic and atraumatic.      Nose: Nose normal.      Mouth/Throat:      Mouth: Mucous membranes are moist.   Eyes:      Extraocular Movements: Extraocular movements intact.      Pupils: Pupils are equal, round, and reactive to light.   Cardiovascular:      Rate and Rhythm: Normal rate and regular rhythm.      Pulses: Normal pulses.      Heart sounds: Normal heart sounds.   Pulmonary:      Effort: Pulmonary effort is normal.      Breath sounds: Normal breath sounds.   Abdominal:      General: Abdomen is flat. Bowel sounds are normal.      Palpations: Abdomen is soft.   Musculoskeletal:         General: Normal range of motion.      Cervical back: Normal range of motion.   Skin:     General: Skin is warm and dry.   Neurological:      Mental Status: He is alert. Mental status is at baseline.   Psychiatric:         Mood and Affect: Mood normal.         Outpatient Follow-Up  Follow-up with PCP within 1 week.      CHRISTINE Hawkins-CNP

## 2023-10-24 NOTE — CARE PLAN
Problem: Fall/Injury  Goal: Not fall by end of shift  Outcome: Progressing  Goal: Be free from injury by end of the shift  Outcome: Progressing  Goal: Verbalize understanding of personal risk factors for fall in the hospital  Outcome: Progressing  Goal: Verbalize understanding of risk factor reduction measures to prevent injury from fall in the home  Outcome: Progressing  Goal: Use assistive devices by end of the shift  Outcome: Progressing  Goal: Pace activities to prevent fatigue by end of the shift  Outcome: Progressing     Problem: Pain  Goal: Takes deep breaths with improved pain control throughout the shift  Outcome: Progressing  Goal: Turns in bed with improved pain control throughout the shift  Outcome: Progressing  Goal: Walks with improved pain control throughout the shift  Outcome: Progressing  Goal: Performs ADL's with improved pain control throughout shift  Outcome: Progressing  Goal: Participates in PT with improved pain control throughout the shift  Outcome: Progressing     Problem: Skin  Goal: Participates in plan/prevention/treatment measures  Outcome: Progressing  Goal: Prevent/manage excess moisture  Outcome: Progressing  Goal: Prevent/minimize sheer/friction injuries  Outcome: Progressing  Goal: Promote/optimize nutrition  Outcome: Progressing  Goal: Promote skin healing  Outcome: Progressing   The patient's goals for the shift include no falls    The clinical goals for the shift include Pain free, adequate rest and sleep, no falls

## 2023-10-24 NOTE — NURSING NOTE
Assumed patient care. BSSR received from previous Nurse. Seen patient lying on bed awake, no distress or discomfort noted. Needs attended. Safety measures ensured and call light in reach.   Plan of care ongoing.

## 2023-10-24 NOTE — PROGRESS NOTES
Occupational Therapy    Occupational Therapy Treatment    Name: Boaz Bowens  MRN: [id number]  : 1927  Date: 10/24/23  Time Calculation  Start Time: 1203  Stop Time: 1224  Time Calculation (min): 21 min    Assessment:  OT Assessment: Patient continues to demonstrate functional deficits that impact ability to perform ADL and functional mobility.  Prognosis: Fair  Barriers to Discharge:  (Pain)  Evaluation/Treatment Tolerance: Patient limited by pain  Medical Staff Made Aware: Yes  End of Session Communication: Bedside nurse  End of Session Patient Position: Bed, 3 rail up, Alarm on (All needs within reach)  Plan:  Treatment Interventions:  (Continue per OT POC)    Subjective   General:  OT Last Visit  OT Received On: 10/24/23  Prior to Session Communication: Bedside nurse (RN cleared patient for OT session with no indication of adverse effects of participation.  Noted that patient was medicated for pain.)  Patient Position Received: Bed, 3 rail up, Alarm on  Preferred Learning Style: verbal  General Comment: Resting in bed.  Agreeable to tx session.     Pain Assessment:  Pain Assessment  Pain Assessment: 0-10  Pain Score: 8  Pain Type: Chronic pain  Pain Location: Back  Pain Orientation: Mid  Pain Descriptors: Aching  Pain Onset: Ongoing  Effect of Pain on Daily Activities: limited participation; patient screams out frequently with minimal or no movement  Pain Interventions: Repositioned, Rest     Objective   Activities of Daily Living: Grooming  Grooming Level of Assistance: Minimum assistance  Grooming Where Assessed: Bed level  Grooming Comments: Patient declined sitting eob and declined completing oral hygiene tasks despite encouragement.  Agreeable only to hand hygiene after use of urinal and face washing.  Setup assist and verbal cues req'd for follow-through.  Anticipate min assist for other grooming/hygiene tasks.    UE Bathing  UE Bathing Comments: Patient declined bathing tasks despite encouragement  and explanation that bathing can be completed at bed level.    Toileting  Where Assessed: Bed level  Toileting Comments: Patient able to use urinal at bed level without assistance.    Anticipate dependent assist for hygiene and clothing mgmt s/p BM.     Bed Mobility/Transfers: Bed Mobility  Bed Mobility: Yes  Bed Mobility 1  Bed Mobility 1: Rolling right, Rolling left  Level of Assistance 1: Maximum assistance, Maximum verbal cues  Bed Mobility Comments 1: Instructed on log-roll technique to minimize c/o pain.  Patient able to bend knees without physical assistance.  Able to partially roll to each side, but req'd max assist x1 to complete full roll, 2x to each side.  Patient screamed loudly during each bed mobility attempt and req'd max cues for encouragement and follow-through.  Bed Mobility 2  Bed Mobility Comments 2: Patient refused to attempt sitting eob despite max encouragement and education.    Transfers  Transfer: No    Outcome Measures:  [facility] Daily Activity  Putting on and taking off regular lower body clothing: Total  Bathing (including washing, rinsing, drying): A lot  Putting on and taking off regular upper body clothing: A lot  Toileting, which includes using toilet, bedpan or urinal: A lot  Taking care of personal grooming such as brushing teeth: A little  Eating Meals: A little  Daily Activity - Total Score: 13      Education Documentation  ADL Training, taught by Ani Quiñonez OT at 10/24/2023  1:31 PM.  Learner: Patient  Readiness: Acceptance  Method: Explanation  Response: Verbalizes Understanding, Needs Reinforcement    Education Comments  No comments found.      Goals:  Encounter Problems       Encounter Problems (Active)       OT Goals       Pt will perform functional mobility household distance at min A level with RW  (Not Progressing)       Start:  10/23/23    Expected End:  11/10/23            Pt will complete ADL tasks with min A using AE as needed, in order to complete self-care tasks.   (Progressing)       Start:  10/23/23    Expected End:  11/10/23            Pt will perform functional transfers at min A level with Rw  (Not Progressing)       Start:  10/23/23    Expected End:  11/10/23            Pt will perform GENTLE upper body therapeutic exercises all joints/planes of motion with close S, NO RESISTANCE (Progressing)       Start:  10/23/23    Expected End:  11/10/23

## 2023-10-24 NOTE — PROGRESS NOTES
Anticipate discharge soon. Precert approved for admission to [facility]. Precert good through 10/26/2023. Patient can discharge anytime after 1300 today. RN to call report. Transport to be set up. TCC to complete 7000 once [place] completed.     **PATIENT WITH A SAFE DISCHARGE PLAN      Ara Chambers RN

## 2023-10-25 ENCOUNTER — HOSPITAL ENCOUNTER (OUTPATIENT)
Dept: CARDIOLOGY | Facility: HOSPITAL | Age: 88
Discharge: HOME | End: 2023-10-25
Payer: MEDICARE

## 2023-10-25 LAB
ATRIAL RATE: 70 BPM
P AXIS: -81 DEGREES
P OFFSET: 164 MS
P ONSET: 125 MS
PR INTERVAL: 202 MS
Q ONSET: 226 MS
QRS COUNT: 12 BEATS
QRS DURATION: 92 MS
QT INTERVAL: 388 MS
QTC CALCULATION(BAZETT): 419 MS
QTC FREDERICIA: 408 MS
R AXIS: -46 DEGREES
T AXIS: -45 DEGREES
T OFFSET: 420 MS
VENTRICULAR RATE: 70 BPM

## 2023-10-26 LAB — BACTERIA BLD CULT: NORMAL

## 2023-12-14 ENCOUNTER — APPOINTMENT (OUTPATIENT)
Dept: RADIOLOGY | Facility: HOSPITAL | Age: 88
End: 2023-12-14
Payer: MEDICARE

## 2023-12-14 ENCOUNTER — HOSPITAL ENCOUNTER (EMERGENCY)
Facility: HOSPITAL | Age: 88
Discharge: HOME | End: 2023-12-14
Attending: EMERGENCY MEDICINE
Payer: MEDICARE

## 2023-12-14 VITALS
WEIGHT: 160 LBS | HEIGHT: 70 IN | DIASTOLIC BLOOD PRESSURE: 73 MMHG | SYSTOLIC BLOOD PRESSURE: 120 MMHG | RESPIRATION RATE: 17 BRPM | BODY MASS INDEX: 22.9 KG/M2 | OXYGEN SATURATION: 95 % | TEMPERATURE: 97.9 F | HEART RATE: 75 BPM

## 2023-12-14 DIAGNOSIS — S09.90XA CLOSED HEAD INJURY, INITIAL ENCOUNTER: ICD-10-CM
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
## 2023-12-14 PROCEDURE — 73610 X-RAY EXAM OF ANKLE: CPT | Mod: LT,FY

## 2023-12-14 PROCEDURE — 72125 CT NECK SPINE W/O DYE: CPT

## 2023-12-14 PROCEDURE — 70450 CT HEAD/BRAIN W/O DYE: CPT

## 2023-12-14 PROCEDURE — 99285 EMERGENCY DEPT VISIT HI MDM: CPT | Performed by: EMERGENCY MEDICINE

## 2023-12-14 ASSESSMENT — PAIN - FUNCTIONAL ASSESSMENT: PAIN_FUNCTIONAL_ASSESSMENT: 0-10

## 2023-12-14 ASSESSMENT — COLUMBIA-SUICIDE SEVERITY RATING SCALE - C-SSRS
6. HAVE YOU EVER DONE ANYTHING, STARTED TO DO ANYTHING, OR PREPARED TO DO ANYTHING TO END YOUR LIFE?: NO
2. HAVE YOU ACTUALLY HAD ANY THOUGHTS OF KILLING YOURSELF?: NO
1. IN THE PAST MONTH, HAVE YOU WISHED YOU WERE DEAD OR WISHED YOU COULD GO TO SLEEP AND NOT WAKE UP?: NO

## 2023-12-14 ASSESSMENT — LIFESTYLE VARIABLES
EVER HAD A DRINK FIRST THING IN THE MORNING TO STEADY YOUR NERVES TO GET RID OF A HANGOVER: NO
HAVE PEOPLE ANNOYED YOU BY CRITICIZING YOUR DRINKING: NO
EVER FELT BAD OR GUILTY ABOUT YOUR DRINKING: NO
HAVE YOU EVER FELT YOU SHOULD CUT DOWN ON YOUR DRINKING: NO
REASON UNABLE TO ASSESS: NO

## 2023-12-14 NOTE — ED TRIAGE NOTES
Pt had a witnessed fall by staff pt tripped and fell. Pt did not hit his head. Pt denies any pain at this time. Pt has a small skin tear on left forearm.

## 2023-12-14 NOTE — ED PROVIDER NOTES
HPI   Chief Complaint   Patient presents with    Fall       Patient is a 89-year-old male, pleasantly demented, presenting after a fall.  Patient presenting via EMS from [place].  Story from EMS is that patient was in the bathroom, when he fell backwards.  He denies having hit his head as well as denies being on blood thinners.  EMS confirms the story.  While with EMS, patient complained of left ankle pain, but currently is not complaining of pain.  Patient denies dizziness or blurry vision prior to fall.  Patient does endorse having a small skin tear on his right forearm, that he believes happened as EMS was transferring the patient from their bed to a hospital bed.  Patient currently denies visual changes, headache, dizziness, chest pain, shortness of breath, abdominal pain, nausea, vomiting, diarrhea.  Patient typically A&Ox2 at baseline.                          Yarelis Coma Scale Score: 14                  Patient History   Past Medical History:   Diagnosis Date    Cancer (CMS/HCC)     Frequent urinary tract infections     Urethral cancer (CMS/HCC)      Past Surgical History:   Procedure Laterality Date    APPENDECTOMY      HEMORRHOID SURGERY      TOTAL KNEE ARTHROPLASTY Right     TOTAL KNEE ARTHROPLASTY Left      Family History   Problem Relation Name Age of Onset    Angina Mother      Cancer Father      Stroke Father      Stroke Sister      Cancer Sister       Social History     Tobacco Use    Smoking status: Former     Types: Cigarettes    Smokeless tobacco: Not on file   Vaping Use    Vaping Use: Never used   Substance Use Topics    Alcohol use: Not on file    Drug use: Never       Physical Exam   ED Triage Vitals [12/14/23 1439]   Temp Heart Rate Resp BP   -- 80 18 115/69      SpO2 Temp src Heart Rate Source Patient Position   95 % -- -- --      BP Location FiO2 (%)     -- --       Physical Exam  Constitutional:       Appearance: Normal appearance.   HENT:      Head: Normocephalic and atraumatic.       Nose: Nose normal.      Mouth/Throat:      Mouth: Mucous membranes are moist.      Pharynx: Oropharynx is clear.   Eyes:      Extraocular Movements: Extraocular movements intact.      Pupils: Pupils are equal, round, and reactive to light.   Cardiovascular:      Rate and Rhythm: Normal rate and regular rhythm.      Pulses: Normal pulses.      Heart sounds: Normal heart sounds.   Pulmonary:      Effort: Pulmonary effort is normal.      Breath sounds: Normal breath sounds.   Abdominal:      General: Abdomen is flat.      Palpations: Abdomen is soft.   Musculoskeletal:         General: Normal range of motion.      Left forearm: Laceration present.      Cervical back: Normal range of motion and neck supple. No spinous process tenderness or muscular tenderness.      Comments: Minor skin tear present on left forearm   Skin:     General: Skin is warm and dry.   Neurological:      General: No focal deficit present.      Mental Status: He is alert. Mental status is at baseline.      Comments: Patient A&Ox2, at baseline   Psychiatric:         Mood and Affect: Mood normal.         Behavior: Behavior normal. Behavior is cooperative.         ED Course & [facility]   ED Course as of 12/14/23 1659   Thu Dec 14, 2023   1549 Family present at bedside, spoke with him about the patient's history.  Discussed imaging, and reasoning behind the scans that were ordered. [AR]      ED Course User Index  [AR] Cory Dumont PA-C         Diagnoses as of 12/14/23 1659   Fall, initial encounter   Closed head injury, initial encounter       Medical Decision Making  Patient is a 89-year-old male with past medical history of dementia presenting after a fall.  Due to patient's chief complaint of left ankle pain with EMS, left ankle x-ray was ordered.  Out of precaution due to patient's age, head and neck CTs ordered.  Conditions considered include but are not limited to: Mechanical fall, vertigo, intracranial hemorrhage, stroke, hypoglycemia, UTI.   Left ankle x-ray benign.  CT head showed no acute intracranial hemorrhage.  CT C-spine was benign as well.  Hypoglycemia and UTI less likely due to lack of history of diabetes and no urinary symptoms.  Discussed with family members and patient the results of imaging performed.  Due to patient's benign results, and him having no symptoms at this time, the disposition of discharge is acceptable.  Also discussed with patient the small skin tear present on the left forearm.  This should heal on its own and a dressing such as a Band-Aid to cover the wound is acceptable.  I believe he is at low risk for complication from the chief complaint due to benign scans.  Patient and family members comfortable with disposition discharge.  A follow-up with primary care as needed was also recommended.  Return to the emergency department if new or worsening symptoms including headache, increased pain, weakness, visual deficits.    Portions of this note made with Dragon software, please be mindful of potential spelling errors.        Procedure  Procedures     Cory Dumont PA-C  12/14/23 9189

## 2023-12-14 NOTE — ED NOTES
Attempted to  call report back to [place] [id number] and was unsuccessful, no one was able to answer.      Clari Jones RN  12/14/23 4274
